# Patient Record
Sex: MALE | Race: WHITE | NOT HISPANIC OR LATINO | URBAN - METROPOLITAN AREA
[De-identification: names, ages, dates, MRNs, and addresses within clinical notes are randomized per-mention and may not be internally consistent; named-entity substitution may affect disease eponyms.]

---

## 2019-02-14 ENCOUNTER — EMERGENCY (EMERGENCY)
Facility: HOSPITAL | Age: 69
LOS: 1 days | End: 2019-02-14

## 2019-02-14 VITALS
OXYGEN SATURATION: 98 % | DIASTOLIC BLOOD PRESSURE: 81 MMHG | HEIGHT: 68 IN | SYSTOLIC BLOOD PRESSURE: 146 MMHG | WEIGHT: 145.06 LBS | RESPIRATION RATE: 17 BRPM | HEART RATE: 52 BPM | TEMPERATURE: 98 F

## 2019-02-14 DIAGNOSIS — G06.2 EXTRADURAL AND SUBDURAL ABSCESS, UNSPECIFIED: Chronic | ICD-10-CM

## 2019-02-14 NOTE — ED ADULT NURSE REASSESSMENT NOTE - NS ED NURSE REASSESS COMMENT FT1
taken for EKG; pt refuses ekg; states pain is gone; opts to go home; advised he may  return if needed

## 2019-02-14 NOTE — ED ADULT NURSE NOTE - EXPLANATION OF PATIENT'S REASON FOR LEAVING
"my chest pain is gone.  I know I'm not having a heart attack.  I'll call tomorrow morning to refill the prescription."

## 2019-03-12 ENCOUNTER — EMERGENCY (EMERGENCY)
Facility: HOSPITAL | Age: 69
LOS: 1 days | Discharge: HOME | End: 2019-03-12
Attending: EMERGENCY MEDICINE | Admitting: EMERGENCY MEDICINE
Payer: MEDICARE

## 2019-03-12 VITALS
OXYGEN SATURATION: 98 % | WEIGHT: 143.08 LBS | DIASTOLIC BLOOD PRESSURE: 80 MMHG | HEIGHT: 68 IN | TEMPERATURE: 98 F | RESPIRATION RATE: 18 BRPM | HEART RATE: 86 BPM | SYSTOLIC BLOOD PRESSURE: 164 MMHG

## 2019-03-12 DIAGNOSIS — G06.2 EXTRADURAL AND SUBDURAL ABSCESS, UNSPECIFIED: Chronic | ICD-10-CM

## 2019-03-12 DIAGNOSIS — Z79.891 LONG TERM (CURRENT) USE OF OPIATE ANALGESIC: ICD-10-CM

## 2019-03-12 DIAGNOSIS — Z79.2 LONG TERM (CURRENT) USE OF ANTIBIOTICS: ICD-10-CM

## 2019-03-12 DIAGNOSIS — R07.9 CHEST PAIN, UNSPECIFIED: ICD-10-CM

## 2019-03-12 PROCEDURE — 93005 ELECTROCARDIOGRAM TRACING: CPT

## 2019-03-12 PROCEDURE — 93010 ELECTROCARDIOGRAM REPORT: CPT

## 2019-03-12 PROCEDURE — 99284 EMERGENCY DEPT VISIT MOD MDM: CPT | Mod: 25

## 2019-03-12 PROCEDURE — 99283 EMERGENCY DEPT VISIT LOW MDM: CPT

## 2019-03-12 NOTE — ED PROVIDER NOTE - ATTENDING CONTRIBUTION TO CARE
I have seen the pt, reviewed all pertinent clinical data, and I agree with the documentation/care/plan executed by MODESTO Cote.

## 2019-03-12 NOTE — ED ADULT NURSE NOTE - CHPI ED NUR SYMPTOMS NEG
no dizziness/no shortness of breath/no chills/no diaphoresis/no vomiting/no fever/no nausea/no syncope/no back pain/no congestion

## 2019-03-12 NOTE — ED ADULT NURSE NOTE - OBJECTIVE STATEMENT
Pt ambulates into ED w/ steady gait, airway patent, A&Ox4 in NAD eliciting CP.  After ECG performed pt states "I don't want any other tests but the ECG."  PA made aware.  Pt agrees to DC AMA.

## 2019-03-12 NOTE — ED PROVIDER NOTE - OBJECTIVE STATEMENT
reports CP today after lifting boxes though fully abated at this time -> states Hx renal disfunction after long term abx for spine infection along with known suprapubic catheter -> states had cardiac w/u ~ 18 months ago  and " well" History limited as patient states no pain at present and seeking AMA d/c if ekg " ok "

## 2019-04-23 ENCOUNTER — EMERGENCY (EMERGENCY)
Facility: HOSPITAL | Age: 69
LOS: 1 days | Discharge: HOME | End: 2019-04-23
Attending: EMERGENCY MEDICINE
Payer: MEDICARE

## 2019-04-23 VITALS
HEART RATE: 76 BPM | OXYGEN SATURATION: 97 % | RESPIRATION RATE: 20 BRPM | DIASTOLIC BLOOD PRESSURE: 78 MMHG | WEIGHT: 145.06 LBS | SYSTOLIC BLOOD PRESSURE: 132 MMHG | HEIGHT: 68 IN | TEMPERATURE: 98 F

## 2019-04-23 VITALS
SYSTOLIC BLOOD PRESSURE: 114 MMHG | HEART RATE: 85 BPM | OXYGEN SATURATION: 98 % | TEMPERATURE: 98 F | DIASTOLIC BLOOD PRESSURE: 70 MMHG | RESPIRATION RATE: 18 BRPM

## 2019-04-23 DIAGNOSIS — G06.2 EXTRADURAL AND SUBDURAL ABSCESS, UNSPECIFIED: Chronic | ICD-10-CM

## 2019-04-23 PROCEDURE — 93010 ELECTROCARDIOGRAM REPORT: CPT

## 2019-04-23 PROCEDURE — 82962 GLUCOSE BLOOD TEST: CPT

## 2019-04-23 PROCEDURE — 99283 EMERGENCY DEPT VISIT LOW MDM: CPT

## 2019-04-23 PROCEDURE — 93005 ELECTROCARDIOGRAM TRACING: CPT

## 2019-04-23 PROCEDURE — 99284 EMERGENCY DEPT VISIT MOD MDM: CPT | Mod: 25

## 2019-04-23 NOTE — ED PROVIDER NOTE - PHYSICAL EXAMINATION
Lamin BRYAN MD PGY1:   PHYSICAL EXAM:    GENERAL: NAD, well-developed  HEENT:  Atraumatic, Normocephalic  CHEST/LUNG: Chest rise equal bilaterally  HEART: Regular rate and rhythm  ABDOMEN: Soft, Nontender, Nondistended  EXTREMITIES:  2+ Peripheral Pulses.  PSYCH: A&Ox3  SKIN: No obvious rashes or lesions

## 2019-04-23 NOTE — ED ADULT NURSE NOTE - OBJECTIVE STATEMENT
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 Patient states the chest pain started 1.5 hours prior to arrival to ED. Pt states that as he was driving he had chest pain again, pulled over, and states he fainted for a few seconds and came back. Pt then drove to ED. Pt states the pain stayed in his chest and then went to his left shoulder. Pt states that this has happened before. Pt did not take aspirin today. Pt states that he is anemic and does not want more blood to be drawn. Pt states that if his EKG is normal he would like to leave. MD Quezada spoke about the risks of leaving the ED with the patient and the patient has been discharged. 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 Patient states the chest pain started 1.5 hours prior to arrival to ED. Pt states that as he was driving he had chest pain again, pulled over, and states he "fainted for a few seconds and came back". Pt then drove to ED. Pt states the pain stayed in his chest and then went to his left shoulder. Pt states that this has happened before. Pt did not take aspirin today. Pt states that he is "anemic and does not want more blood to be drawn". Pt states that if his EKG is normal he would like to leave. MD Quezada spoke about the risks of leaving the ED with the patient and the patient has been discharged.

## 2019-04-23 NOTE — ED ADULT NURSE NOTE - NSIMPLEMENTINTERV_GEN_ALL_ED
Implemented All Fall Risk Interventions:  [place] to call system. Call bell, personal items and telephone within reach. Instruct patient to call for assistance. Room bathroom lighting operational. Non-slip footwear when patient is off stretcher. Physically safe environment: no spills, clutter or unnecessary equipment. Stretcher in lowest position, wheels locked, appropriate side rails in place. Provide visual cue, wrist band, yellow gown, etc. Monitor gait and stability. Monitor for mental status changes and reorient to person, place, and time. Review medications for side effects contributing to fall risk. Reinforce activity limits and safety measures with patient and family.

## 2019-04-23 NOTE — ED ADULT TRIAGE NOTE - CHIEF COMPLAINT QUOTE
pt states while driving had some chest discomfort pulled over got lightheaded states possible syncope at that time then proceded to drive to er

## 2019-04-23 NOTE — ED PROVIDER NOTE - PMH
Anemia  Transfused 3/2015  Back pain    BPH (benign prostatic hyperplasia)    Epidural abscess  3/2015 MRSA  admitted & treated  Patient was managed with Epidural injection for pain Prior to MRSA  Other hydronephrosis  Renal insuffiencey  Spinal stenosis    UTI (urinary tract infection)  With Colonization of Bacteria

## 2019-04-23 NOTE — ED PROVIDER NOTE - OBJECTIVE STATEMENT
Lamin BRYAN MD PGY1: 68 M PMH atonic bladder with suprapubic catheter p/w episode of chest pain and syncope while driving 1.5 hours ago. Assoc with some SOB, was unconcious for a few minutes before regaining conciousness and driving to the ED. Currrently asymptomatic. Last stress 2 months ago wnl. Recently seen in a [facility] for chest pain and had set of normal trops. Patient does not want to stay for laboratory testing.

## 2019-04-23 NOTE — ED PROVIDER NOTE - PROGRESS NOTE DETAILS
ekg performed pt declines any bloodwork explained to pt that with hyperacute t waves there may be electrolyte abnorm and  or early ischemia -- pt verbalizes comprehension of this wants to fu with his cardiologist offered asa pt declines - pt having cp only 1.5 hrs assoc w sob and nausea rad to l shoulder ho htn - concern for acs - pt understands bhagat would include cxr, terle and serial cardiac biomarkers - pt refuses - is ware that he could leave the ed and sysmptoms could worsen including but not limited to STEMi and even dealth Risks, benefits , alternatives discussed with patient.  Patient declines the test is aware of the potential consequences. Patient will follow up with their primary physician

## 2019-04-23 NOTE — ED PROVIDER NOTE - CLINICAL SUMMARY MEDICAL DECISION MAKING FREE TEXT BOX
gaetano pt with [facility] rad to l w assoc s/s including sob no prev cad - stress 5 months ago nml - pt ekg w hyperdynamic t waves - would moniotr delta trop asa and reeval to r/o acs gaetano pt with [facility] rad to l w assoc s/s including sob no prev cad - stress 5 months ago nml - pt ekg w hyperdynamic t waves - would moniotr delta trop asa and reeval to r/o acs    Lamin BRYAN MD PGY1: as above. Patient declining further testing while in the ED beyond and EKG. Patient does not desire testing beyond an EKG. Patient informed that his EKG represented hyperacute T waves that could suggest early MI and we heavily recommend staying for delta trop testing. His episode of syncope is also concerning as it is unclear in etiology and possibly cardiac and we would possibly echo. Patient declines further testing and understand benefits and risks, including death, of not having testing done.

## 2019-04-28 ENCOUNTER — EMERGENCY (EMERGENCY)
Facility: HOSPITAL | Age: 69
LOS: 1 days | Discharge: HOME | End: 2019-04-28
Attending: EMERGENCY MEDICINE
Payer: MEDICARE

## 2019-04-28 VITALS
HEART RATE: 62 BPM | DIASTOLIC BLOOD PRESSURE: 73 MMHG | SYSTOLIC BLOOD PRESSURE: 151 MMHG | OXYGEN SATURATION: 100 % | RESPIRATION RATE: 18 BRPM

## 2019-04-28 VITALS
WEIGHT: 145.06 LBS | RESPIRATION RATE: 20 BRPM | HEART RATE: 53 BPM | OXYGEN SATURATION: 100 % | DIASTOLIC BLOOD PRESSURE: 85 MMHG | TEMPERATURE: 98 F | SYSTOLIC BLOOD PRESSURE: 153 MMHG | HEIGHT: 68 IN

## 2019-04-28 DIAGNOSIS — G06.2 EXTRADURAL AND SUBDURAL ABSCESS, UNSPECIFIED: Chronic | ICD-10-CM

## 2019-04-28 LAB
ALBUMIN SERPL ELPH-MCNC: 3.9 G/DL — SIGNIFICANT CHANGE UP (ref 3.3–5)
ALP SERPL-CCNC: 69 U/L — SIGNIFICANT CHANGE UP (ref 40–120)
ALT FLD-CCNC: 21 U/L — SIGNIFICANT CHANGE UP (ref 10–45)
ANION GAP SERPL CALC-SCNC: 12 MMOL/L — SIGNIFICANT CHANGE UP (ref 5–17)
APPEARANCE UR: ABNORMAL
AST SERPL-CCNC: 26 U/L — SIGNIFICANT CHANGE UP (ref 10–40)
BACTERIA # UR AUTO: ABNORMAL
BILIRUB SERPL-MCNC: 0.6 MG/DL — SIGNIFICANT CHANGE UP (ref 0.2–1.2)
BILIRUB UR-MCNC: NEGATIVE — SIGNIFICANT CHANGE UP
BUN SERPL-MCNC: 32 MG/DL — HIGH (ref 7–23)
CALCIUM SERPL-MCNC: 8.9 MG/DL — SIGNIFICANT CHANGE UP (ref 8.4–10.5)
CHLORIDE SERPL-SCNC: 100 MMOL/L — SIGNIFICANT CHANGE UP (ref 96–108)
CO2 SERPL-SCNC: 19 MMOL/L — LOW (ref 22–31)
COLOR SPEC: YELLOW — SIGNIFICANT CHANGE UP
CREAT SERPL-MCNC: 2.71 MG/DL — HIGH (ref 0.5–1.3)
DIFF PNL FLD: ABNORMAL
EPI CELLS # UR: 0 /HPF — SIGNIFICANT CHANGE UP
GLUCOSE SERPL-MCNC: 88 MG/DL — SIGNIFICANT CHANGE UP (ref 70–99)
GLUCOSE UR QL: NEGATIVE — SIGNIFICANT CHANGE UP
HCT VFR BLD CALC: 33.2 % — LOW (ref 39–50)
HGB BLD-MCNC: 11.6 G/DL — LOW (ref 13–17)
HYALINE CASTS # UR AUTO: 1 /LPF — SIGNIFICANT CHANGE UP (ref 0–2)
KETONES UR-MCNC: NEGATIVE — SIGNIFICANT CHANGE UP
LEUKOCYTE ESTERASE UR-ACNC: ABNORMAL
MAGNESIUM SERPL-MCNC: 1.9 MG/DL — SIGNIFICANT CHANGE UP (ref 1.6–2.6)
MCHC RBC-ENTMCNC: 32.2 PG — SIGNIFICANT CHANGE UP (ref 27–34)
MCHC RBC-ENTMCNC: 35 GM/DL — SIGNIFICANT CHANGE UP (ref 32–36)
MCV RBC AUTO: 91.8 FL — SIGNIFICANT CHANGE UP (ref 80–100)
NITRITE UR-MCNC: POSITIVE
NT-PROBNP SERPL-SCNC: 237 PG/ML — SIGNIFICANT CHANGE UP (ref 0–300)
PH UR: 6 — SIGNIFICANT CHANGE UP (ref 5–8)
PLATELET # BLD AUTO: 237 K/UL — SIGNIFICANT CHANGE UP (ref 150–400)
POTASSIUM SERPL-MCNC: 4.2 MMOL/L — SIGNIFICANT CHANGE UP (ref 3.5–5.3)
POTASSIUM SERPL-SCNC: 4.2 MMOL/L — SIGNIFICANT CHANGE UP (ref 3.5–5.3)
PROT SERPL-MCNC: 6.5 G/DL — SIGNIFICANT CHANGE UP (ref 6–8.3)
PROT UR-MCNC: SIGNIFICANT CHANGE UP
RBC # BLD: 3.61 M/UL — LOW (ref 4.2–5.8)
RBC # FLD: 11.5 % — SIGNIFICANT CHANGE UP (ref 10.3–14.5)
RBC CASTS # UR COMP ASSIST: 6 /HPF — HIGH (ref 0–4)
SODIUM SERPL-SCNC: 131 MMOL/L — LOW (ref 135–145)
SP GR SPEC: 1.01 — LOW (ref 1.01–1.02)
TROPONIN T, HIGH SENSITIVITY RESULT: 21 NG/L — SIGNIFICANT CHANGE UP (ref 0–51)
TROPONIN T, HIGH SENSITIVITY RESULT: 22 NG/L — SIGNIFICANT CHANGE UP (ref 0–51)
UROBILINOGEN FLD QL: NEGATIVE — SIGNIFICANT CHANGE UP
WBC # BLD: 7.1 K/UL — SIGNIFICANT CHANGE UP (ref 3.8–10.5)
WBC # FLD AUTO: 7.1 K/UL — SIGNIFICANT CHANGE UP (ref 3.8–10.5)
WBC UR QL: 252 /HPF — HIGH (ref 0–5)

## 2019-04-28 PROCEDURE — 84484 ASSAY OF TROPONIN QUANT: CPT

## 2019-04-28 PROCEDURE — 99284 EMERGENCY DEPT VISIT MOD MDM: CPT | Mod: 25

## 2019-04-28 PROCEDURE — 93005 ELECTROCARDIOGRAM TRACING: CPT

## 2019-04-28 PROCEDURE — 93010 ELECTROCARDIOGRAM REPORT: CPT

## 2019-04-28 PROCEDURE — 80053 COMPREHEN METABOLIC PANEL: CPT

## 2019-04-28 PROCEDURE — 83880 ASSAY OF NATRIURETIC PEPTIDE: CPT

## 2019-04-28 PROCEDURE — 99284 EMERGENCY DEPT VISIT MOD MDM: CPT | Mod: GC,25

## 2019-04-28 PROCEDURE — 85027 COMPLETE CBC AUTOMATED: CPT

## 2019-04-28 PROCEDURE — 83735 ASSAY OF MAGNESIUM: CPT

## 2019-04-28 PROCEDURE — 81001 URINALYSIS AUTO W/SCOPE: CPT

## 2019-04-28 NOTE — ED ADULT NURSE NOTE - OBJECTIVE STATEMENT
pt 67 yo male seen here 4 days ago for near syncope and chest discomfort returns today for labs pt refused labs the other day because he had tennis games all week pt alert oriented hx kidney disease vanna suprapubic catheter after MRSA infection ost back epidural few years ago pt very active runner  denies any symptoms since the other day pt states perhaps he took 20mg of lisinopril instead of 10mg dose he was supposed to pt ekg completed with labs sent as ordered urine requested from patient placed on continuous cardiac monitering

## 2019-04-28 NOTE — ED PROVIDER NOTE - PHYSICAL EXAMINATION
PHYSICAL EXAM:  GENERAL: NAD,   HEAD:  Atraumatic, Normocephalic  EYES: conjunctiva and sclera clear  ENMT:  Moist mucous membranes  NECK: Supple, No JVD, Normal thyroid  HEART: Regular rate and rhythm; No murmurs, rubs, or gallops  RESPIRATORY: CTA B/L, No W/R/R  ABDOMEN: Soft, Nontender, Nondistended; Bowel sounds present; suprapubic catheter in place  NEUROLOGY: A&Ox3, nonfocal, moving all extremities  EXTREMITIES:  No pedal edema  SKIN: warm, dry, normal color, no rash or abnormal lesions

## 2019-04-28 NOTE — ED PROVIDER NOTE - CLINICAL SUMMARY MEDICAL DECISION MAKING FREE TEXT BOX
68 M PMH atonic bladder with suprapubic catheter p/w episode of chest pain and syncope while driving on Tuesday. Pt with hyperacute T waves on prior EKG continue to be present on current EKG. Will obtain CBC, CMP, BNP, troponin, urinalysis, CXR and cardiac monitoring

## 2019-04-28 NOTE — ED PROVIDER NOTE - ATTENDING CONTRIBUTION TO CARE
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 Pt  believes may have been related to inadvertently taking took much lisinopril. Had normal stress test two years ago. Seen in ed with neg ekg and advise further workup and declined. Pt dc home and since then has been well. Playing tennis 2h/d, with running 5miles x 3, No chest pain, shortness of breath,palps,dizziness, abd pain. diaphoresis, ha, cough, fever chills. PE WDWN male awake alert normocephalic atraumatic neck supple chest clear anterior & posterior. CV no rubs, gallops or murmurs, Neuro gcs 15 speech fluent power 5/5 all extr pain light touch intact, abd Supraopubic cath in place without ttp. LS spine old surg scar no ttp/swelling warmth.  Chad Sanchez MD, Facep

## 2019-04-28 NOTE — ED PROVIDER NOTE - PLAN OF CARE
To follow up with your primary care doctor In this hospital visit your troponin level was found elevated above range. Please follow up your primary care doctor and consider a stress test.

## 2019-04-28 NOTE — ED PROVIDER NOTE - OBJECTIVE STATEMENT
68 M PMH atonic bladder with suprapubic catheter p/w episode of chest pain and syncope while driving on Tuesday. States that CP was in the center of chest and felt like heart burn. States that after CP got out of the car and felt dizzy, but did not loose consciousness. He presented to the ED on 4/23 where he was found to have hyperacute t waves, but patient declined to have any blood work done. Today patient comes in for blood work. Denies any recurrent episodes of chest pain.

## 2019-04-28 NOTE — ED PROVIDER NOTE - NS ED ROS FT
REVIEW OF SYSTEMS:  CONSTITUTIONAL: No weakness, fevers or chills  EYES/ENT: No visual changesn   NECK: No pain or stiffness  RESPIRATORY: No shortness of breath  CARDIOVASCULAR: No chest pain or palpitations  GASTROINTESTINAL: No abdominal or epigastric pain. No nausea or vomiting; No diarrhea or constipation.   GENITOURINARY: No dysuria, frequency or hematuria  SKIN: No itching, burning, rashes, or lesions   All other review of systems is negative unless indicated above.

## 2019-04-28 NOTE — ED PROVIDER NOTE - PROGRESS NOTE DETAILS
Patient refuses cxr. Explained to patient that this is part of the work up. However pt states that he feels fine and that there is nothing wrong with his lungs or heart.

## 2019-04-28 NOTE — ED PROVIDER NOTE - CARE PLAN
Principal Discharge DX:	Troponin level elevated  Goal:	To follow up with your primary care doctor  Assessment and plan of treatment:	In this hospital visit your troponin level was found elevated above range. Please follow up your primary care doctor and consider a stress test.  Secondary Diagnosis:	Chest pain

## 2019-04-28 NOTE — ED ADULT NURSE NOTE - NSIMPLEMENTINTERV_GEN_ALL_ED
Implemented All Universal Safety Interventions:  [place] to call system. Call bell, personal items and telephone within reach. Instruct patient to call for assistance. Room bathroom lighting operational. Non-slip footwear when patient is off stretcher. Physically safe environment: no spills, clutter or unnecessary equipment. Stretcher in lowest position, wheels locked, appropriate side rails in place.

## 2020-06-26 ENCOUNTER — TRANSCRIPTION ENCOUNTER (OUTPATIENT)
Age: 70
End: 2020-06-26

## 2020-08-10 ENCOUNTER — TRANSCRIPTION ENCOUNTER (OUTPATIENT)
Age: 70
End: 2020-08-10

## 2020-08-14 ENCOUNTER — TRANSCRIPTION ENCOUNTER (OUTPATIENT)
Age: 70
End: 2020-08-14

## 2020-10-19 ENCOUNTER — APPOINTMENT (OUTPATIENT)
Dept: ORTHOPEDIC SURGERY | Facility: CLINIC | Age: 70
End: 2020-10-19
Payer: MEDICARE

## 2020-10-19 VITALS
SYSTOLIC BLOOD PRESSURE: 148 MMHG | WEIGHT: 145 LBS | BODY MASS INDEX: 22.76 KG/M2 | HEIGHT: 67 IN | DIASTOLIC BLOOD PRESSURE: 82 MMHG

## 2020-10-19 DIAGNOSIS — Z60.2 PROBLEMS RELATED TO LIVING ALONE: ICD-10-CM

## 2020-10-19 DIAGNOSIS — M47.816 SPONDYLOSIS W/OUT MYELOPATHY OR RADICULOPATHY, LUMBAR REGION: ICD-10-CM

## 2020-10-19 PROCEDURE — 99072 ADDL SUPL MATRL&STAF TM PHE: CPT

## 2020-10-19 PROCEDURE — 99204 OFFICE O/P NEW MOD 45 MIN: CPT

## 2020-10-19 PROCEDURE — 72110 X-RAY EXAM L-2 SPINE 4/>VWS: CPT

## 2020-10-19 RX ORDER — ATORVASTATIN CALCIUM 10 MG/1
10 TABLET, FILM COATED ORAL
Refills: 0 | Status: ACTIVE | COMMUNITY

## 2020-10-19 RX ORDER — LISINOPRIL 10 MG/1
10 TABLET ORAL
Refills: 0 | Status: ACTIVE | COMMUNITY

## 2020-10-19 RX ORDER — FERROUS SULFATE 325(65) MG
TABLET ORAL
Refills: 0 | Status: ACTIVE | COMMUNITY

## 2020-10-19 RX ORDER — PREDNISONE 10 MG/1
10 TABLET ORAL
Qty: 39 | Refills: 0 | Status: ACTIVE | COMMUNITY
Start: 2020-10-19 | End: 1900-01-01

## 2020-10-19 SDOH — SOCIAL STABILITY - SOCIAL INSECURITY: PROBLEMS RELATED TO LIVING ALONE: Z60.2

## 2020-10-23 ENCOUNTER — APPOINTMENT (OUTPATIENT)
Dept: ORTHOPEDIC SURGERY | Facility: CLINIC | Age: 70
End: 2020-10-23

## 2020-11-20 ENCOUNTER — APPOINTMENT (OUTPATIENT)
Dept: ORTHOPEDIC SURGERY | Facility: CLINIC | Age: 70
End: 2020-11-20
Payer: MEDICARE

## 2020-11-20 VITALS — HEIGHT: 68 IN | WEIGHT: 145 LBS | BODY MASS INDEX: 21.98 KG/M2

## 2020-11-20 VITALS — TEMPERATURE: 98.3 F

## 2020-11-20 PROCEDURE — 73562 X-RAY EXAM OF KNEE 3: CPT | Mod: LT

## 2020-11-20 PROCEDURE — 20610 DRAIN/INJ JOINT/BURSA W/O US: CPT | Mod: LT

## 2020-11-20 PROCEDURE — 99214 OFFICE O/P EST MOD 30 MIN: CPT | Mod: 25

## 2020-12-09 RX ORDER — HYALURONATE SODIUM, STABILIZED 88 MG/4 ML
88 SYRINGE (ML) INTRAARTICULAR
Qty: 1 | Refills: 0 | Status: ACTIVE | COMMUNITY
Start: 2020-12-09 | End: 1900-01-01

## 2020-12-18 ENCOUNTER — APPOINTMENT (OUTPATIENT)
Dept: ORTHOPEDIC SURGERY | Facility: CLINIC | Age: 70
End: 2020-12-18
Payer: MEDICARE

## 2020-12-18 VITALS — TEMPERATURE: 97.3 F

## 2020-12-18 PROCEDURE — 20610 DRAIN/INJ JOINT/BURSA W/O US: CPT | Mod: LT

## 2020-12-18 PROCEDURE — 99072 ADDL SUPL MATRL&STAF TM PHE: CPT

## 2021-01-19 ENCOUNTER — APPOINTMENT (OUTPATIENT)
Dept: SPINE | Facility: CLINIC | Age: 71
End: 2021-01-19

## 2021-01-19 VITALS
SYSTOLIC BLOOD PRESSURE: 107 MMHG | HEART RATE: 51 BPM | TEMPERATURE: 97.7 F | OXYGEN SATURATION: 99 % | HEIGHT: 68 IN | WEIGHT: 145 LBS | DIASTOLIC BLOOD PRESSURE: 60 MMHG | BODY MASS INDEX: 21.98 KG/M2

## 2021-03-10 ENCOUNTER — APPOINTMENT (OUTPATIENT)
Dept: ORTHOPEDIC SURGERY | Facility: CLINIC | Age: 71
End: 2021-03-10
Payer: MEDICARE

## 2021-03-10 PROCEDURE — 99072 ADDL SUPL MATRL&STAF TM PHE: CPT

## 2021-03-10 PROCEDURE — 99214 OFFICE O/P EST MOD 30 MIN: CPT | Mod: 25

## 2021-03-10 PROCEDURE — 20610 DRAIN/INJ JOINT/BURSA W/O US: CPT | Mod: LT

## 2021-03-10 PROCEDURE — 73562 X-RAY EXAM OF KNEE 3: CPT | Mod: LT

## 2021-03-16 ENCOUNTER — APPOINTMENT (OUTPATIENT)
Dept: PHYSICAL MEDICINE AND REHAB | Facility: CLINIC | Age: 71
End: 2021-03-16
Payer: MEDICARE

## 2021-03-16 PROCEDURE — 99203 OFFICE O/P NEW LOW 30 MIN: CPT

## 2021-03-16 PROCEDURE — 99072 ADDL SUPL MATRL&STAF TM PHE: CPT

## 2021-03-16 NOTE — REVIEW OF SYSTEMS
[Muscle Weakness] : muscle weakness [Difficulty Walking] : difficulty walking [Fever] : no fever [Chills] : no chills [Chest Pain] : no chest pain [Shortness Of Breath] : no shortness of breath [Wheezing] : no wheezing [Abdominal Pain] : no abdominal pain [FreeTextEntry8] : neurogenic bladder

## 2021-03-16 NOTE — HISTORY OF PRESENT ILLNESS
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 Per patient, he had MRSA infection of his spine in 2015 as a complication of an epidural steroid injection that ultimately required lumbar discectomy, with hospital course complicated by acute renal failure and anemia. He sees Dr. Morgan for medical pain management.\par \par Patient has left foot drop and weakness since his infection, and despite rehab and PT, and exercise regimen, has not improved in strength of his left foot. He ambulates at home and in the community initially with RW, now Quad cane, but had multiple and near falls due to his foot drop. He also has a neurogenic bladder due to injury and has a suprapubic catheter.\par \par He lives in an apartment with 0 MELISSA the building but 1 flight of stairs (10 steps w/ HRx1) to his apartment. He has since retired due to his injury.

## 2021-03-16 NOTE — ASSESSMENT
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 \par Patient will benefit with left AFO for safety in ambulating in the community and prevent falls and complications from such falls. \par Plan:\par - prescription for custom molded AFO provided to orthotist\par - f/u as needed

## 2021-03-16 NOTE — PHYSICAL EXAM
[FreeTextEntry1] : General: AAOx3, appears stated age, NAD\par Cards:+s1/s2\par Pulm: Breathing comfortably in room air\par Abdomen: NTND, soft\par : Suprapubic catheter\par Neurological: Decreased sensation to LT at dorsum of foot and ankle, between 1st and second toes on the left compared to right.\par DTR Right KJ 1+/4, AJ 2+/4, Left KJ 2+/4, AJ 1+/4 \par MSK: 5/5 B/L UE, 5/5 RLE. For LLE: HF 5/5, KE 5/5, KF 5/5, DF 1/5, EHL 1/5, PF 2/5\par Gait: Ambulate with quadcane on right hand. Foot drop on left with left hip hike for clearance. \par Unable to toe walk or heel walk.\par

## 2021-04-02 ENCOUNTER — APPOINTMENT (OUTPATIENT)
Dept: NEUROLOGY | Facility: CLINIC | Age: 71
End: 2021-04-02
Payer: MEDICARE

## 2021-04-02 VITALS — TEMPERATURE: 97.7 F

## 2021-04-02 DIAGNOSIS — M54.17 RADICULOPATHY, LUMBOSACRAL REGION: ICD-10-CM

## 2021-04-02 PROCEDURE — 95885 MUSC TST DONE W/NERV TST LIM: CPT

## 2021-04-02 PROCEDURE — 95908 NRV CNDJ TST 3-4 STUDIES: CPT

## 2021-04-02 PROCEDURE — 99203 OFFICE O/P NEW LOW 30 MIN: CPT | Mod: 25

## 2021-04-02 PROCEDURE — 99072 ADDL SUPL MATRL&STAF TM PHE: CPT

## 2021-04-02 NOTE — ASSESSMENT
[FreeTextEntry1] : EMG/NCV today demonstrates a chronic left L5S1 radiculopathy. \par \par Will refer back to Dr. Cassidy.

## 2021-04-02 NOTE — PHYSICAL EXAM
[1+] : Patella right 1+ [2+] : Ankle jerk left 2+ [FreeTextEntry6] : left foot DF 0/5, ev 4-/5, inv 3/5, PF 3/5, right foot 5/5

## 2021-04-02 NOTE — HISTORY OF PRESENT ILLNESS
[FreeTextEntry1] : Patient referred for EMG to evaluate left foot drop.  He has had this since 2015 when he had a MRSA infection LS spine. \par \par He denies pain but has mild numbness dorsum left foot. \par

## 2021-04-30 ENCOUNTER — APPOINTMENT (OUTPATIENT)
Dept: ORTHOPEDIC SURGERY | Facility: CLINIC | Age: 71
End: 2021-04-30
Payer: MEDICARE

## 2021-04-30 PROCEDURE — 99072 ADDL SUPL MATRL&STAF TM PHE: CPT

## 2021-04-30 PROCEDURE — 99214 OFFICE O/P EST MOD 30 MIN: CPT | Mod: 25

## 2021-04-30 PROCEDURE — 20610 DRAIN/INJ JOINT/BURSA W/O US: CPT | Mod: 50

## 2021-05-26 ENCOUNTER — APPOINTMENT (OUTPATIENT)
Dept: PAIN MANAGEMENT | Facility: CLINIC | Age: 71
End: 2021-05-26
Payer: MEDICARE

## 2021-05-26 VITALS
SYSTOLIC BLOOD PRESSURE: 133 MMHG | HEART RATE: 71 BPM | WEIGHT: 145 LBS | BODY MASS INDEX: 21.98 KG/M2 | DIASTOLIC BLOOD PRESSURE: 74 MMHG | HEIGHT: 68 IN

## 2021-05-26 PROCEDURE — 99204 OFFICE O/P NEW MOD 45 MIN: CPT

## 2021-05-26 NOTE — HISTORY OF PRESENT ILLNESS
[FreeTextEntry1] : Patient was seen by me in 2016 s/p Lumbar abscess requiring surgical intervention - did well on fentanyl and dilaudid. Weaned self off the meds in 2016 and pain remained controlled with ES Tylenol until November 2020. He recalls falling one month earlier but did not initially co pain until one month later. Went to see Dr. Mon in November - whi felt pain was secondary to arthritis and was prescribed prednisone with 90 percent relief. However, pain has gradually returned to 9/10. Notes ho kidney disease since ALESSANDRA.

## 2021-05-26 NOTE — ASSESSMENT
[FreeTextEntry1] : Chronic LS radiculopathy\par Chronic back pain\par \par Will initiate endocet 75 mgs tid as directed 2 week supply \par Patient can not take NSAIDS DUE TO KIDNEY DISEASE\par istop reviewed\par UDT\par No signs of toxicity observed and will continue to monitor.\par \par \par \par \par  [Opioids] : Patient was explained in detail about pain control by using opioids. Patient has signed and fully understands our guidelines for medication and drug screening.  Patient understands the side effects of opioids, including, but not limited to, drug tolerance, dependence, potential for addiction. This class of drugs is habit-forming and [place] regulated. The sedative effects of opioids can be potentiated by taking alcohol or any sleeping pills, along with opioids. The decision to drive is patient’s responsibility, as opioids can affect his/her driving ability and ability to concentrate. The long-term place is not clear, however, patient understands that once the pain control optimizes, the goal will be to wean off the opioids. All the issues regarding opioid treatment have been addressed satisfactorily.

## 2021-05-26 NOTE — PHYSICAL EXAM
[FreeTextEntry1] : Constitutional: No signs of distress. No signs of toxicity. \par MS: Alert and well oriented. Speech fluent. No aphasia. Fund of knowledge intact. \par Psychiatric: Mood stable.\par symmetrically, tongue midline\par Motor: Adequate bulk, tone, strength. 5/5 strength\par DTR: present and symmetrical; no clonus\par Sensory: intact to primary and secondary modalities\par Cerebellar intact Gait: antalgic with cane\par Eyes: no redness or swelling\par HEENT: intact\par Neck: No masses noted\par Pulmonary: no respiratory distress\par Vascular: no temperature,color changes; no edema\par Musculoskeletal: examination of the cervical spine reveals no midline tenderness, range of motion full upon flexion, extension and lateral rotation. Negative facet tenderness, Examination of the lumbar spine reveals no bilateral right > left paraspinal tenderness; Range of motion limited flexion and extension;No tenderness of sciatic notch, No tenderness of bilateral greater trochanters, Negative MADONNA, negative SLRT bilaterally\par Skin: No rash.\par

## 2021-06-29 ENCOUNTER — APPOINTMENT (OUTPATIENT)
Dept: PAIN MANAGEMENT | Facility: CLINIC | Age: 71
End: 2021-06-29
Payer: MEDICARE

## 2021-06-29 VITALS
BODY MASS INDEX: 21.22 KG/M2 | HEIGHT: 68 IN | HEART RATE: 70 BPM | SYSTOLIC BLOOD PRESSURE: 136 MMHG | DIASTOLIC BLOOD PRESSURE: 80 MMHG | WEIGHT: 140 LBS

## 2021-06-29 PROCEDURE — 99214 OFFICE O/P EST MOD 30 MIN: CPT

## 2021-07-05 NOTE — HISTORY OF PRESENT ILLNESS
[FreeTextEntry1] : Patient reports the oxycodone promoted significant pain relief. Off it for 2 weeks due to insurance issues.\par \par The pain meds took the pain away able to be more functional No new medical problems.\par \par

## 2021-07-05 NOTE — ASSESSMENT
[Opioids] : Patient was explained in detail about pain control by using opioids. Patient has signed and fully understands our guidelines for medication and drug screening.  Patient understands the side effects of opioids, including, but not limited to, drug tolerance, dependence, potential for addiction. This class of drugs is habit-forming and [place] regulated. The sedative effects of opioids can be potentiated by taking alcohol or any sleeping pills, along with opioids. The decision to drive is patient’s responsibility, as opioids can affect his/her driving ability and ability to concentrate. The long-term place is not clear, however, patient understands that once the pain control optimizes, the goal will be to wean off the opioids. All the issues regarding opioid treatment have been addressed satisfactorily.  [FreeTextEntry1] : Chronic LS radiculopathy\par Chronic back pain\par \par Will initiate endocet 75 mgs tid as directed 2 week supply \par Patient can not take NSAIDS DUE TO KIDNEY DISEASE\par istop reviewed\par UDT\par No signs of toxicity observed and will continue to monitor.\par \par \par \par \par

## 2021-07-07 DIAGNOSIS — Z12.11 ENCOUNTER FOR SCREENING FOR MALIGNANT NEOPLASM OF COLON: ICD-10-CM

## 2021-07-07 RX ORDER — POLYETHYLENE GLYCOL 3350, SODIUM SULFATE, SODIUM CHLORIDE, POTASSIUM CHLORIDE, SODIUM ASCORBATE, AND ASCORBIC ACID 7.5-2.691G
100 KIT ORAL
Qty: 1 | Refills: 0 | Status: DISCONTINUED | COMMUNITY
Start: 2021-06-12 | End: 2021-07-07

## 2021-07-07 RX ORDER — POLYETHYLENE GLYCOL-3350 AND ELECTROLYTES 236; 6.74; 5.86; 2.97; 22.74 G/274.31G; G/274.31G; G/274.31G; G/274.31G; G/274.31G
236 POWDER, FOR SOLUTION ORAL
Qty: 1 | Refills: 0 | Status: ACTIVE | COMMUNITY
Start: 2021-07-07 | End: 1900-01-01

## 2021-07-13 ENCOUNTER — OUTPATIENT (OUTPATIENT)
Dept: OUTPATIENT SERVICES | Facility: HOSPITAL | Age: 71
LOS: 1 days | Discharge: HOME | End: 2021-07-13
Payer: MEDICARE

## 2021-07-13 ENCOUNTER — RESULT REVIEW (OUTPATIENT)
Age: 71
End: 2021-07-13

## 2021-07-13 ENCOUNTER — APPOINTMENT (OUTPATIENT)
Dept: GASTROENTEROLOGY | Facility: HOSPITAL | Age: 71
End: 2021-07-13

## 2021-07-13 VITALS
WEIGHT: 141.1 LBS | SYSTOLIC BLOOD PRESSURE: 129 MMHG | HEIGHT: 68 IN | RESPIRATION RATE: 19 BRPM | HEART RATE: 66 BPM | DIASTOLIC BLOOD PRESSURE: 71 MMHG | TEMPERATURE: 98 F | OXYGEN SATURATION: 99 %

## 2021-07-13 VITALS
SYSTOLIC BLOOD PRESSURE: 132 MMHG | DIASTOLIC BLOOD PRESSURE: 58 MMHG | HEART RATE: 67 BPM | OXYGEN SATURATION: 100 % | RESPIRATION RATE: 14 BRPM

## 2021-07-13 DIAGNOSIS — Z12.11 ENCOUNTER FOR SCREENING FOR MALIGNANT NEOPLASM OF COLON: ICD-10-CM

## 2021-07-13 DIAGNOSIS — G06.2 EXTRADURAL AND SUBDURAL ABSCESS, UNSPECIFIED: Chronic | ICD-10-CM

## 2021-07-13 PROCEDURE — 45380 COLONOSCOPY AND BIOPSY: CPT

## 2021-07-13 PROCEDURE — 88305 TISSUE EXAM BY PATHOLOGIST: CPT | Mod: 26

## 2021-07-15 LAB — SURGICAL PATHOLOGY STUDY: SIGNIFICANT CHANGE UP

## 2021-07-18 ENCOUNTER — NON-APPOINTMENT (OUTPATIENT)
Age: 71
End: 2021-07-18

## 2021-07-26 ENCOUNTER — APPOINTMENT (OUTPATIENT)
Dept: PAIN MANAGEMENT | Facility: CLINIC | Age: 71
End: 2021-07-26
Payer: MEDICARE

## 2021-07-26 PROCEDURE — 99212 OFFICE O/P EST SF 10 MIN: CPT | Mod: 95

## 2021-07-26 NOTE — ASSESSMENT
[FreeTextEntry1] : NO changes today . \par RTO 1 m onth \par TEB is ok. \par I will continue to monitor ISTOP . \par

## 2021-07-26 NOTE — HISTORY OF PRESENT ILLNESS
[FreeTextEntry1] : istop#[id number]\par Dx : Lower back pain. \par Pt has a lumbar discectomy in 2015 , then had MRSA infection. \par \par Pain is in lower back and can sometimes radiate to legs.\par No signs of toxicity noted today. \par Pt reminded to avoid alcohol and keep meds secure .\par Pt denies constipation. \par

## 2021-07-26 NOTE — REASON FOR VISIT
[Home] : at home, [unfilled] , at the time of the visit. [Medical Office: ([facility])___] : at the medical office located in  [Verbal consent obtained from patient] : the patient, [unfilled] [Follow-Up: _____] : a [unfilled] follow-up visit

## 2021-08-16 ENCOUNTER — APPOINTMENT (OUTPATIENT)
Dept: PAIN MANAGEMENT | Facility: CLINIC | Age: 71
End: 2021-08-16
Payer: MEDICARE

## 2021-08-16 PROCEDURE — 99212 OFFICE O/P EST SF 10 MIN: CPT | Mod: 95

## 2021-08-16 NOTE — PHYSICAL EXAM
[General Appearance - Alert] : alert [General Appearance - Well-Appearing] : healthy appearing [] : normal voice and communication [Oriented To Time, Place, And Person] : oriented to person, place, and time [Mood] : the mood was normal [Affect] : the affect was normal [Sclera] : the sclera and conjunctiva were normal

## 2021-08-16 NOTE — REASON FOR VISIT
[Home] : at home, [unfilled] , at the time of the visit. [Medical Office: ([facility])___] : at the medical office located in  [Verbal consent obtained from patient] : the patient, [unfilled]

## 2021-08-16 NOTE — REVIEW OF SYSTEMS
[Fever] : no fever [Chills] : no chills [Chest Pain] : no chest pain [Palpitations] : no palpitations [Constipation] : no constipation

## 2021-08-16 NOTE — HISTORY OF PRESENT ILLNESS
[FreeTextEntry1] : ISTOP # [id number]\par Pt continues to have lower back pain. \par Had a hx of Left drop foot. \par Denies dysfunction to bladder or bowel. \par \par Mood has been stable . \par Denies alcohol use. \par \par Is under care of Nephrology for chronic kidney disease.

## 2021-08-16 NOTE — ASSESSMENT
[FreeTextEntry1] : Discussed with pt trying to decrease to BID dosing or decreasing to 5mg . \par Pt will try to decrease to BID dosing . \par RTO 1 month \par TEB is ok . [Opioids] : Patient was explained in detail about pain control by using opioids. Patient has signed and fully understands our guidelines for medication and drug screening.  Patient understands the side effects of opioids, including, but not limited to, drug tolerance, dependence, potential for addiction. This class of drugs is habit-forming and [place] regulated. The sedative effects of opioids can be potentiated by taking alcohol or any sleeping pills, along with opioids. The decision to drive is patient’s responsibility, as opioids can affect his/her driving ability and ability to concentrate. The long-term place is not clear, however, patient understands that once the pain control optimizes, the goal will be to wean off the opioids. All the issues regarding opioid treatment have been addressed satisfactorily.

## 2021-08-31 ENCOUNTER — EMERGENCY (EMERGENCY)
Facility: HOSPITAL | Age: 71
LOS: 1 days | End: 2021-08-31
Payer: MEDICARE

## 2021-08-31 VITALS
DIASTOLIC BLOOD PRESSURE: 82 MMHG | HEART RATE: 82 BPM | TEMPERATURE: 99 F | OXYGEN SATURATION: 98 % | SYSTOLIC BLOOD PRESSURE: 138 MMHG | HEIGHT: 68 IN | RESPIRATION RATE: 18 BRPM

## 2021-08-31 DIAGNOSIS — G06.2 EXTRADURAL AND SUBDURAL ABSCESS, UNSPECIFIED: Chronic | ICD-10-CM

## 2021-08-31 PROCEDURE — L9991: CPT

## 2021-08-31 NOTE — ED ADULT TRIAGE NOTE - AS HEIGHT TYPE
[de-identified] : 75-year-old male right knee arthritis exacerbation\par \par Injection: Right knee joint.\par Indication: [Arthritis\par A discussion was had with the patient regarding this procedure and all questions were answered. All risks, benefits and alternatives were discussed. These included but were not limited to bleeding, infection, and allergic reaction. Alcohol was used to clean the skin, and betadine was used to sterilize and prep the area in the supero-lateral aspect of the right knee. Ethyl chloride spray was then used as a topical anesthetic. A 21-gauge needle was used to inject 4cc of 1% lidocaine and 1cc of 40mg/ml methylprednisolone into the knee. A sterile bandage was then applied. The patient tolerated the procedure well and there were no complications. \par \par All questions answered followup as needed. 
stated

## 2021-09-01 ENCOUNTER — TRANSCRIPTION ENCOUNTER (OUTPATIENT)
Age: 71
End: 2021-09-01

## 2021-09-28 ENCOUNTER — APPOINTMENT (OUTPATIENT)
Dept: PAIN MANAGEMENT | Facility: CLINIC | Age: 71
End: 2021-09-28
Payer: MEDICARE

## 2021-09-28 DIAGNOSIS — M21.372 FOOT DROP, LEFT FOOT: ICD-10-CM

## 2021-09-28 PROCEDURE — 99213 OFFICE O/P EST LOW 20 MIN: CPT | Mod: 95

## 2021-09-28 NOTE — PHYSICAL EXAM
[General Appearance - Alert] : alert [FreeTextEntry1] : no signs of toxicity  [Oriented To Time, Place, And Person] : oriented to person, place, and time [Affect] : the affect was normal

## 2021-09-28 NOTE — HISTORY OF PRESENT ILLNESS
[Home] : at home, [unfilled] , at the time of the visit. [Medical Office: ([facility])___] : at the medical office located in  [Verbal consent obtained from patient] : the patient, [unfilled] [FreeTextEntry1] : 69 y/o M with Pmhx left foot drop and chronic back pain who is following up today via telemedicine. He tried to decreased his Oxycodone but his pain worsened and he increased this back. Denies new medical issues.  No constipation and having BM daily.  \par \par \par

## 2021-09-28 NOTE — ASSESSMENT
[FreeTextEntry1] : 71 y/o M with Pmhx left foot drop and chronic back pain. \par \par -he is on Oxycodone 7.5/325 TID qty 90 and is having difficulty tapering down. \par -will Change to Oxycodone 7.5/325 3-4x/day qty 120 to provide pain relief throughout the day and continue to taper further\par I-Stop reviewed,  reference #: [id number]\par No signs of aberrant behavior and will continue to monitor for signs of toxicity.\par Reminded to continue to avoid alcohol.\par \par \par I am seeing CHARITY CASTILLO as incident to service. Dr. Morgan is present in the office suite immediately available and able to provide assistance and direction throughout the time the service was performed.\par

## 2021-10-12 ENCOUNTER — APPOINTMENT (OUTPATIENT)
Dept: PAIN MANAGEMENT | Facility: CLINIC | Age: 71
End: 2021-10-12
Payer: MEDICARE

## 2021-10-12 PROCEDURE — 99212 OFFICE O/P EST SF 10 MIN: CPT | Mod: 95

## 2021-10-12 NOTE — HISTORY OF PRESENT ILLNESS
[FreeTextEntry1] : Pt returns today via TEB . \par He continues to have lower back pain ,report his pain is worse after Percocet was changed from 7.5/ 325mg to 5/325mg QID . Pt has had to take more tab - 4 1/2 per day . \par He denies any new symptoms. Patient wishes to go back to 7.5 mg TID . \par No signs of toxicity noted \par  ISTOP #[id number]

## 2021-10-12 NOTE — ASSESSMENT
[FreeTextEntry1] : I agree to return to Percocet 7.5mg / 325mg TID , should not be filled until closer to refill date ~ 10/25.\par  [Opioids] : Patient was explained in detail about pain control by using opioids. Patient has signed and fully understands our guidelines for medication and drug screening.  Patient understands the side effects of opioids, including, but not limited to, drug tolerance, dependence, potential for addiction. This class of drugs is habit-forming and [place] regulated. The sedative effects of opioids can be potentiated by taking alcohol or any sleeping pills, along with opioids. The decision to drive is patient’s responsibility, as opioids can affect his/her driving ability and ability to concentrate. The long-term place is not clear, however, patient understands that once the pain control optimizes, the goal will be to wean off the opioids. All the issues regarding opioid treatment have been addressed satisfactorily.

## 2021-11-11 ENCOUNTER — APPOINTMENT (OUTPATIENT)
Dept: ORTHOPEDIC SURGERY | Facility: CLINIC | Age: 71
End: 2021-11-11
Payer: MEDICARE

## 2021-11-11 VITALS — WEIGHT: 140 LBS | BODY MASS INDEX: 21.22 KG/M2 | HEIGHT: 68 IN

## 2021-11-11 DIAGNOSIS — M25.562 PAIN IN LEFT KNEE: ICD-10-CM

## 2021-11-11 PROCEDURE — 20610 DRAIN/INJ JOINT/BURSA W/O US: CPT | Mod: LT

## 2021-11-11 RX ORDER — HYALURONATE SODIUM, STABILIZED 88 MG/4 ML
88 SYRINGE (ML) INTRAARTICULAR
Qty: 1 | Refills: 0 | Status: ACTIVE | COMMUNITY
Start: 2021-11-11

## 2021-11-12 PROBLEM — M25.562 ACUTE PAIN OF LEFT KNEE: Status: ACTIVE | Noted: 2020-11-20

## 2021-11-17 NOTE — ED PROVIDER NOTE - SEVERITY
1)  Call Physician for any signs of wound infection: Fever greater than 101 degrees F, bleeding, separation of incision, pus like drainage, or excessive swelling      2)  Call Physician for difficulty breathing, vomiting, inability to tolerate oral intake      3)  Call Physician for any pain that is not controlled by pain medication or anything worrisome      4)  Avoid driving while taking pain medications or experiencing pain      5)  Contact physician's office for post-operative appointment  6) Call Physician if soaking a pad in less than an hour for two consecutive pads   7) Pelvic rest until cleared by your physician  8) If taking an oral birth control, may need a back-up method for next 7-10 days.     Additional Paperwork:  When To Call Your Doctor  Tips for Dealing with Nausea and Vomiting  Constipation  Care After Your Gynecological Procedures    1) Llame al médico por cualquier signo de infección de la herida: fiebre superior a 101 grados F, sangrado, separación de la incisión, supuración similar a pus o hinchazón excesiva  2) Llame al médico si tiene dificultad para respirar, vómitos o incapacidad para tolerar la ingesta oral.  3) Llame al médico por cualquier dolor que no esté controlado con analgésicos o cualquier cosa preocupante.  4) Evite conducir mientras kapil analgésicos o experimenta dolor.  5) Comuníquese con el consultorio del médico para dylan raoul postoperatoria  6) Llame al médico si empapa dylan toalla sanitaria en menos de dylan hora rob dos toallas higiénicas consecutivas  7) Confluence pélvico hasta que al médico lo autorice  8) Si está tomando un método anticonceptivo oral, es posible que necesite un método de respaldo rob los próximos 7 a 10 días.    Papeleo adicional:  Cuándo llamar a al médico  Consejos para lidiar con las náuseas y los vómitos  Estreñimiento  Cuidados después de celi procedimientos ginecológicos  
PAIN SCALE 5 OF 10.

## 2021-11-18 ENCOUNTER — APPOINTMENT (OUTPATIENT)
Dept: ORTHOPEDIC SURGERY | Facility: CLINIC | Age: 71
End: 2021-11-18
Payer: MEDICARE

## 2021-11-18 VITALS
BODY MASS INDEX: 21.22 KG/M2 | SYSTOLIC BLOOD PRESSURE: 163 MMHG | HEART RATE: 75 BPM | HEIGHT: 68 IN | WEIGHT: 140 LBS | DIASTOLIC BLOOD PRESSURE: 55 MMHG

## 2021-11-18 DIAGNOSIS — M17.12 UNILATERAL PRIMARY OSTEOARTHRITIS, LEFT KNEE: ICD-10-CM

## 2021-11-18 PROCEDURE — 20610 DRAIN/INJ JOINT/BURSA W/O US: CPT | Mod: 50

## 2021-11-18 PROCEDURE — 73562 X-RAY EXAM OF KNEE 3: CPT | Mod: LT

## 2021-11-18 PROCEDURE — 99214 OFFICE O/P EST MOD 30 MIN: CPT | Mod: 25

## 2021-11-30 ENCOUNTER — APPOINTMENT (OUTPATIENT)
Dept: PAIN MANAGEMENT | Facility: CLINIC | Age: 71
End: 2021-11-30

## 2021-12-01 ENCOUNTER — APPOINTMENT (OUTPATIENT)
Dept: PAIN MANAGEMENT | Facility: CLINIC | Age: 71
End: 2021-12-01
Payer: MEDICARE

## 2021-12-01 ENCOUNTER — TRANSCRIPTION ENCOUNTER (OUTPATIENT)
Age: 71
End: 2021-12-01

## 2021-12-01 VITALS
HEART RATE: 46 BPM | SYSTOLIC BLOOD PRESSURE: 156 MMHG | DIASTOLIC BLOOD PRESSURE: 94 MMHG | WEIGHT: 139 LBS | HEIGHT: 68 IN | BODY MASS INDEX: 21.07 KG/M2

## 2021-12-01 DIAGNOSIS — M54.17 RADICULOPATHY, LUMBOSACRAL REGION: ICD-10-CM

## 2021-12-01 DIAGNOSIS — M46.20 OSTEOMYELITIS OF VERTEBRA, SITE UNSPECIFIED: ICD-10-CM

## 2021-12-01 PROCEDURE — 99214 OFFICE O/P EST MOD 30 MIN: CPT

## 2021-12-01 NOTE — REVIEW OF SYSTEMS
[Back Pain] : ~T back pain [As Noted in HPI] : as noted in HPI [Limb Weakness] : limb weakness [Negative] : Heme/Lymph [de-identified] : left foot drop

## 2021-12-01 NOTE — ASSESSMENT
[FreeTextEntry1] : 70 y/o M with Lumbar epidural abscess with hypotonic bladder and left foot drop with chronic lower back pain responding well to Oxycodone 7.5/325 mg TID. \par \par No refill needed today, can increase Oxycodone 7.5/325 mg TID qty  tabs for a few months.  Plan to re-evaluate and gradually taper off oxycodone in 3-4 months. \par I-Stop reviewed,  reference #: [id number]\par No signs of aberrant behavior and will continue to monitor for signs of toxicity.\par Reminded to continue to avoid alcohol.\par UDS repeated today \par \par CHARITY CASTILLO  was seen and examined with Dr. Morgan who agrees with the assessment and plan.\par \par \par \par \par

## 2021-12-01 NOTE — HISTORY OF PRESENT ILLNESS
[FreeTextEntry1] : 70 y/o M with Lumbar epidural abscess with hypotonic bladder and left foot drop with chronic lower back pain who presents today for follow up.  Today he reports that his pain has significantly improved . Without pain meds pain localized in lower back 9-10/10 down to 2/10 with Percocet 7.5/325 mg. Pain gradually increases after about 5 hours . Approximately 5x/month pain is waking him up from sleep . \par \par Denies AE but normal BM \par \par Current meds: Oxycodone 7.5/325 mg TID .

## 2021-12-21 ENCOUNTER — APPOINTMENT (OUTPATIENT)
Dept: PAIN MANAGEMENT | Facility: CLINIC | Age: 71
End: 2021-12-21
Payer: MEDICARE

## 2021-12-21 PROCEDURE — 99212 OFFICE O/P EST SF 10 MIN: CPT | Mod: 95

## 2021-12-21 NOTE — REVIEW OF SYSTEMS
[Back Pain] : ~T back pain [As Noted in HPI] : as noted in HPI [Limb Weakness] : limb weakness [Negative] : Heme/Lymph [de-identified] : left foot drop

## 2021-12-21 NOTE — ASSESSMENT
[FreeTextEntry1] : 72 y/o M with Lumbar epidural abscess with hypotonic bladder and left foot drop with chronic lower back pain. \par \par Increased Oxycodone 7.5/325 mg TID qty  tabs for a few months.  Plan to re-evaluate and gradually taper off oxycodone in 3-4 months. \par I-Stop reviewed,  reference #: [id number]\par No signs of aberrant behavior and will continue to monitor for signs of toxicity.\par Reminded to continue to avoid alcohol.\par \par CHARITY CASTILLO  was seen and examined with Dr. Morgan who agrees with the assessment and plan.\par \par \par \par \par  [Opioids] : Patient was explained in detail about pain control by using opioids. Patient has signed and fully understands our guidelines for medication and drug screening.  Patient understands the side effects of opioids, including, but not limited to, drug tolerance, dependence, potential for addiction. This class of drugs is habit-forming and [place] regulated. The sedative effects of opioids can be potentiated by taking alcohol or any sleeping pills, along with opioids. The decision to drive is patient’s responsibility, as opioids can affect his/her driving ability and ability to concentrate. The long-term place is not clear, however, patient understands that once the pain control optimizes, the goal will be to wean off the opioids. All the issues regarding opioid treatment have been addressed satisfactorily.

## 2021-12-21 NOTE — HISTORY OF PRESENT ILLNESS
[FreeTextEntry1] : 72 y/o M with Lumbar epidural abscess with hypotonic bladder and left foot drop with chronic lower back pain who presents today for follow up. He is having constant pain in his lower back lower back 9-10/10 down to 2/10 with Percocet 7.5/325 mg and gradually increases as the night goes on.  Approximately 5x/month pain is waking him up from sleep . \par \par Denies AE but normal BM \par \par Current meds: Oxycodone 7.5/325 mg TID .

## 2022-01-31 ENCOUNTER — APPOINTMENT (OUTPATIENT)
Dept: PAIN MANAGEMENT | Facility: CLINIC | Age: 72
End: 2022-01-31
Payer: MEDICARE

## 2022-01-31 PROCEDURE — 99212 OFFICE O/P EST SF 10 MIN: CPT | Mod: 95

## 2022-01-31 NOTE — HISTORY OF PRESENT ILLNESS
[FreeTextEntry1] : 70 y/o M with Lumbar epidural abscess with hypotonic bladder and left foot drop with chronic lower back pain who presents today for follow up. He is having constant pain in his lower back lower back 9-10/10 down to 2/10 with Percocet 7.5/325 mg and gradually increases as the night goes on. \par Denies AE normal BM \par \par Current meds: Oxycodone 7.5/325 mg 3-4x/ day .

## 2022-01-31 NOTE — REVIEW OF SYSTEMS
[Back Pain] : ~T back pain [As Noted in HPI] : as noted in HPI [Limb Weakness] : limb weakness [Negative] : Heme/Lymph [de-identified] : left foot drop

## 2022-01-31 NOTE — REASON FOR VISIT
[Home] : at home, [unfilled] , at the time of the visit. [Medical Office: ([facility])___] : at the medical office located in  [Verbal consent obtained from patient] : the patient, [unfilled] [Follow-Up: _____] : a [unfilled] follow-up visit

## 2022-01-31 NOTE — ASSESSMENT
[FreeTextEntry1] : istop#[id number].\par Pt reminded the increase to 100 tablets was temporary and will be re - evaluated in the future to decrease again. \par RTO 1 month for an in person appt. \par We will continue to monitor ISTOP and for signs of toxicity. \par \par  [Opioids] : Patient was explained in detail about pain control by using opioids. Patient has signed and fully understands our guidelines for medication and drug screening.  Patient understands the side effects of opioids, including, but not limited to, drug tolerance, dependence, potential for addiction. This class of drugs is habit-forming and [place] regulated. The sedative effects of opioids can be potentiated by taking alcohol or any sleeping pills, along with opioids. The decision to drive is patient’s responsibility, as opioids can affect his/her driving ability and ability to concentrate. The long-term place is not clear, however, patient understands that once the pain control optimizes, the goal will be to wean off the opioids. All the issues regarding opioid treatment have been addressed satisfactorily.

## 2022-03-03 ENCOUNTER — APPOINTMENT (OUTPATIENT)
Dept: PAIN MANAGEMENT | Facility: CLINIC | Age: 72
End: 2022-03-03
Payer: MEDICARE

## 2022-03-03 PROCEDURE — 99212 OFFICE O/P EST SF 10 MIN: CPT | Mod: 95

## 2022-03-03 NOTE — ASSESSMENT
[Opioids] : Patient was explained in detail about pain control by using opioids. Patient has signed and fully understands our guidelines for medication and drug screening.  Patient understands the side effects of opioids, including, but not limited to, drug tolerance, dependence, potential for addiction. This class of drugs is habit-forming and [place] regulated. The sedative effects of opioids can be potentiated by taking alcohol or any sleeping pills, along with opioids. The decision to drive is patient’s responsibility, as opioids can affect his/her driving ability and ability to concentrate. The long-term place is not clear, however, patient understands that once the pain control optimizes, the goal will be to wean off the opioids. All the issues regarding opioid treatment have been addressed satisfactorily.  [FreeTextEntry1] : 72 y/o M CKD stage 5 with suprapubic cath with Lumbar epidural abscess with hypotonic bladder and left foot drop with chronic lower back pain. ? candidate for renal transplant eval pending at [facility]. \par \par Oxycodone 7.5/325 mg 3-4 qty, qty 110 prescribed to ralign refills with follow up visits but will attempt to taper to  next month.  \par I-Stop reviewed,  reference #: [id number]\par No signs of aberrant behavior and will continue to monitor for signs of toxicity.\par Reminded to continue to avoid alcohol.\par \par CHARITY ANNA  was seen and examined with Dr. Morgan who agrees with the assessment and plan.\par \par \par \par \par

## 2022-03-03 NOTE — REVIEW OF SYSTEMS
[Back Pain] : ~T back pain [As Noted in HPI] : as noted in HPI [Limb Weakness] : limb weakness [Negative] : Heme/Lymph [de-identified] : left foot drop

## 2022-03-03 NOTE — HISTORY OF PRESENT ILLNESS
[FreeTextEntry1] : 72 y/o M CKD stage 5 with Lumbar epidural abscess with hypotonic bladder and left foot drop with chronic lower back pain who is being seen via TEB for follow up.  He is following up regularly with nephro and seeing specialist at [facility] and was be placed on transplant list.   He is having constant pain in his lower back lower back 9-10/10 down to 2-3/10 with Percocet 7.5/325 mg and gradually increases as the night goes on. He has been off medications since yesterday as he ran out since his appointment was made for more than 30 days. \par Denies AE, + constipation on occasion but normal BM \par \par Current meds: Oxycodone 7.5/325 mg 3x/day.

## 2022-03-03 NOTE — REASON FOR VISIT
[Follow-Up: _____] : a [unfilled] follow-up visit [Home] : at home, [unfilled] , at the time of the visit. [Medical Office: ([facility])___] : at the medical office located in  [Verbal consent obtained from patient] : the patient, [unfilled]

## 2022-03-06 ENCOUNTER — EMERGENCY (EMERGENCY)
Facility: HOSPITAL | Age: 72
LOS: 1 days | Discharge: HOME | End: 2022-03-06
Attending: EMERGENCY MEDICINE
Payer: MEDICARE

## 2022-03-06 VITALS
HEART RATE: 70 BPM | RESPIRATION RATE: 18 BRPM | WEIGHT: 139.99 LBS | TEMPERATURE: 98 F | SYSTOLIC BLOOD PRESSURE: 159 MMHG | HEIGHT: 68 IN | DIASTOLIC BLOOD PRESSURE: 88 MMHG | OXYGEN SATURATION: 97 %

## 2022-03-06 DIAGNOSIS — G06.2 EXTRADURAL AND SUBDURAL ABSCESS, UNSPECIFIED: Chronic | ICD-10-CM

## 2022-03-06 PROCEDURE — 99283 EMERGENCY DEPT VISIT LOW MDM: CPT

## 2022-03-07 LAB
APPEARANCE UR: ABNORMAL
BACTERIA # UR AUTO: ABNORMAL
BILIRUB UR-MCNC: NEGATIVE — SIGNIFICANT CHANGE UP
COLOR SPEC: SIGNIFICANT CHANGE UP
DIFF PNL FLD: ABNORMAL
EPI CELLS # UR: 0 /HPF — SIGNIFICANT CHANGE UP
GLUCOSE UR QL: ABNORMAL
HYALINE CASTS # UR AUTO: 3 /LPF — HIGH (ref 0–2)
KETONES UR-MCNC: NEGATIVE — SIGNIFICANT CHANGE UP
LEUKOCYTE ESTERASE UR-ACNC: ABNORMAL
NITRITE UR-MCNC: NEGATIVE — SIGNIFICANT CHANGE UP
PH UR: 6.5 — SIGNIFICANT CHANGE UP (ref 5–8)
PROT UR-MCNC: ABNORMAL
RBC CASTS # UR COMP ASSIST: 5 /HPF — HIGH (ref 0–4)
SP GR SPEC: 1.01 — SIGNIFICANT CHANGE UP (ref 1.01–1.02)
UROBILINOGEN FLD QL: NEGATIVE — SIGNIFICANT CHANGE UP
WBC UR QL: 141 /HPF — HIGH (ref 0–5)

## 2022-03-07 PROCEDURE — 87077 CULTURE AEROBIC IDENTIFY: CPT

## 2022-03-07 PROCEDURE — 99283 EMERGENCY DEPT VISIT LOW MDM: CPT

## 2022-03-07 PROCEDURE — 81001 URINALYSIS AUTO W/SCOPE: CPT

## 2022-03-07 PROCEDURE — 87186 SC STD MICRODIL/AGAR DIL: CPT

## 2022-03-07 PROCEDURE — 87086 URINE CULTURE/COLONY COUNT: CPT

## 2022-03-07 RX ORDER — ACETAMINOPHEN 500 MG
650 TABLET ORAL ONCE
Refills: 0 | Status: COMPLETED | OUTPATIENT
Start: 2022-03-07 | End: 2022-03-07

## 2022-03-07 RX ADMIN — Medication 650 MILLIGRAM(S): at 00:53

## 2022-03-07 NOTE — ED PROVIDER NOTE - NS ED ROS FT
General: denies fever, chills  HENT: denies nasal congestion, rhinorrhea  Eyes: denies visual changes, blurred vision  CV: denies chest pain, palpitations  Resp: denies difficulty breathing, cough  Abdominal: denies nausea, vomiting, diarrhea, abdominal pain  : denies urinary pain or discharge, +right groin burning pain.  MSK: denies muscle aches, leg swelling  Neuro: denies headaches, numbness, tingling  Skin: denies rashes, bruises

## 2022-03-07 NOTE — ED ADULT NURSE NOTE - OBJECTIVE STATEMENT
71M w/ h/o BPH, CKD, suprapubic catheter, recurrent UTIs, presents with 2 days of burning pain in right groin. Patient states pain is "under the skin" of his right groin, does not radiate, is relieved with laying down. 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

## 2022-03-07 NOTE — ED PROVIDER NOTE - PHYSICAL EXAMINATION
GENERAL: well appearing in no acute distress, non-toxic appearing  HEAD: normocephalic, atraumatic  HEENT: normal conjunctiva, oral mucosa moist, uvula midline, no tonsilar exudates, no JVD  CARDIAC: regular rate and rhythm, normal S1S2, no appreciable murmurs, 2+ pulses in UE/LE b/l  PULM: normal breath sounds, clear to ascultation bilaterally, no rales, rhonchi, wheezing  GI: Abd soft, nondistended, nontender, no rebound tenderness, no guarding, no rigidity  : no CVA tenderness b/l, no suprapubic tenderness  NEURO: no focal motor or sensory deficits, CN2-12 intact, normal speech, PERRLA, EOMI, normal gait, AAOx3  MSK: right groin non-tender with no masses or overlying skin changes, ROM intact, no peripheral edema, no calf tenderness b/l  SKIN: well-perfused, extremities warm, no visible rashes  PSYCH: appropriate mood and affect

## 2022-03-07 NOTE — ED PROVIDER NOTE - PATIENT PORTAL LINK FT
You can access the FollowMyHealth Patient Portal offered by [facility] by registering at the following website: [email or web address]. By joining [employer]’s FollowMyHealth portal, you will also be able to view your health information using other applications (apps) compatible with our system.

## 2022-03-07 NOTE — ED PROVIDER NOTE - PROGRESS NOTE DETAILS
Patient refusing blood work, xray, CT. States wants an MRI, we discussed the lack of MRI indication at this time. Patient is agreeable to sending urinalysis and culture as well as taking tylenol.

## 2022-03-07 NOTE — ED PROVIDER NOTE - NSICDXPASTMEDICALHX_GEN_ALL_CORE_FT
Called and let VM for patient to return call. This is the third VM with no return call. It is noted that patient has had follow up for the same issue at a [facility] facility on 10/28/21. PAST MEDICAL HISTORY:  Anemia Transfused 3/2015    Back pain     BPH (benign prostatic hyperplasia)     Epidural abscess 3/2015 MRSA  admitted & treated  Patient was managed with Epidural injection for pain Prior to MRSA    Other hydronephrosis Renal insuffiencey    Spinal stenosis     UTI (urinary tract infection) With Colonization of Bacteria

## 2022-03-07 NOTE — ED PROVIDER NOTE - CLINICAL SUMMARY MEDICAL DECISION MAKING FREE TEXT BOX
71M w/ BPH, CKD5, suprapubic catheter, recurrent UTIs, who p/w 2 days of burning R groin pain, non-radiating, releived with laying down, only mild pain currently. AVSS, right groin non-tender with no masses or overlying skin changes. Likely MSK vs Gas pains, vs UTI. Will send labs, give pain meds, and reassess.

## 2022-03-07 NOTE — ED PROVIDER NOTE - OBJECTIVE STATEMENT
71M w/ h/o BPH, CKD, suprapubic catheter, recurrent UTIs, presents with 2 days of burning pain in right groin. Patient states pain is "under the skin" of his right groin, does not radiate, is relieved with laying down. 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 Patient states pain is "under the skin" of his right groin, does not radiate, is relieved with laying down. 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 Patient had recent bloodwork drawn 2 days ago which he reports he had a normal WCC, and a Hb of 9.7. He is physically active and reports biking and weight lifting. Denies trauma, abd pain, N/V, hematuria, fever/chills. 71M w/ h/o BPH, CKD, suprapubic catheter, recurrent UTIs, presents with 2 days of burning pain in right groin. Patient states pain is "under the skin" of his right groin, does not radiate, is relieved with laying down. 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 Patient had recent bloodwork drawn 2 days ago which he reports he had a normal WCC, and a Hb of 9.7. He is physically active and reports biking and weight lifting. Denies trauma, abd pain, N/V, hematuria, fever/chills.    Attendiny5o male presents with burning sensation in the right groin area.  no fever or chills.  no rash.  pain is now resolved.  no fever.

## 2022-03-07 NOTE — ED PROVIDER NOTE - NSFOLLOWUPINSTRUCTIONS_ED_ALL_ED_FT
Urinary Tract Infection    A urinary tract infection (UTI) is an infection of any part of the urinary tract, which includes the kidneys, ureters, bladder, and urethra. Risk factors include ignoring your need to urinate, wiping back to front if female, being an uncircumcised male, and having diabetes or a weak immune system. Symptoms include frequent urination, pain or burning with urination, foul smelling urine, cloudy urine, pain in the lower abdomen, blood in the urine, and fever. If you were prescribed an antibiotic medicine, take it as told by your health care provider. Do not stop taking the antibiotic even if you start to feel better.    SEEK IMMEDIATE MEDICAL CARE IF YOU HAVE ANY OF THE FOLLOWING SYMPTOMS: severe back or abdominal pain, fever, inability to keep fluids or medicine down, dizziness/lightheadedness, or a change in mental status.    A urine culture was sent and takes up to 48 hours to result, our hospital will reach out to you with the results and if necessary will send a prescription for antibiotics.

## 2022-03-09 LAB
-  AMIKACIN: SIGNIFICANT CHANGE UP
-  AMIKACIN: SIGNIFICANT CHANGE UP
-  AMOXICILLIN/CLAVULANIC ACID: SIGNIFICANT CHANGE UP
-  AMOXICILLIN/CLAVULANIC ACID: SIGNIFICANT CHANGE UP
-  AMPICILLIN/SULBACTAM: SIGNIFICANT CHANGE UP
-  AMPICILLIN/SULBACTAM: SIGNIFICANT CHANGE UP
-  AMPICILLIN: SIGNIFICANT CHANGE UP
-  AMPICILLIN: SIGNIFICANT CHANGE UP
-  AZTREONAM: SIGNIFICANT CHANGE UP
-  AZTREONAM: SIGNIFICANT CHANGE UP
-  CEFAZOLIN: SIGNIFICANT CHANGE UP
-  CEFAZOLIN: SIGNIFICANT CHANGE UP
-  CEFEPIME: SIGNIFICANT CHANGE UP
-  CEFEPIME: SIGNIFICANT CHANGE UP
-  CEFOXITIN: SIGNIFICANT CHANGE UP
-  CEFOXITIN: SIGNIFICANT CHANGE UP
-  CEFTRIAXONE: SIGNIFICANT CHANGE UP
-  CEFTRIAXONE: SIGNIFICANT CHANGE UP
-  CIPROFLOXACIN: SIGNIFICANT CHANGE UP
-  CIPROFLOXACIN: SIGNIFICANT CHANGE UP
-  ERTAPENEM: SIGNIFICANT CHANGE UP
-  ERTAPENEM: SIGNIFICANT CHANGE UP
-  GENTAMICIN: SIGNIFICANT CHANGE UP
-  GENTAMICIN: SIGNIFICANT CHANGE UP
-  IMIPENEM: SIGNIFICANT CHANGE UP
-  IMIPENEM: SIGNIFICANT CHANGE UP
-  LEVOFLOXACIN: SIGNIFICANT CHANGE UP
-  LEVOFLOXACIN: SIGNIFICANT CHANGE UP
-  MEROPENEM: SIGNIFICANT CHANGE UP
-  MEROPENEM: SIGNIFICANT CHANGE UP
-  NITROFURANTOIN: SIGNIFICANT CHANGE UP
-  NITROFURANTOIN: SIGNIFICANT CHANGE UP
-  PIPERACILLIN/TAZOBACTAM: SIGNIFICANT CHANGE UP
-  PIPERACILLIN/TAZOBACTAM: SIGNIFICANT CHANGE UP
-  TIGECYCLINE: SIGNIFICANT CHANGE UP
-  TIGECYCLINE: SIGNIFICANT CHANGE UP
-  TOBRAMYCIN: SIGNIFICANT CHANGE UP
-  TOBRAMYCIN: SIGNIFICANT CHANGE UP
-  TRIMETHOPRIM/SULFAMETHOXAZOLE: SIGNIFICANT CHANGE UP
-  TRIMETHOPRIM/SULFAMETHOXAZOLE: SIGNIFICANT CHANGE UP
METHOD TYPE: SIGNIFICANT CHANGE UP
METHOD TYPE: SIGNIFICANT CHANGE UP

## 2022-03-09 NOTE — ED POST DISCHARGE NOTE - ADDITIONAL DOCUMENTATION
3/15: Pt called ED requesting UCX results. Advised Cx w/ Ecoli, Citrobacter, Enterococcus and Pseudomonas. Reviewed chart, pt with old RX for Cipro. Pt does not have active RX and denies taking abx at home. All bacteria other than ecoli sens to Cipro (ecoli w/ indeterminate sens). Given pseudomonas only has oral sens to Cipro advised pt will send rx to his pharmcay. Pt with CKD, last Cr 3.18 in 2020 CrCl <30 so will need renal dosing. Rx Cipro ER 500mg qd x 5 days sent to pharmacy. - Ayaka Esposito PA-C

## 2022-03-09 NOTE — ED POST DISCHARGE NOTE - DETAILS
3/9/22: pt taking cipro. bacteria sensitive to cipro. No further intervention necessary. Merlyn Cool PA-C

## 2022-03-10 LAB
-  AMIKACIN: SIGNIFICANT CHANGE UP
-  AZTREONAM: SIGNIFICANT CHANGE UP
-  CEFEPIME: SIGNIFICANT CHANGE UP
-  CEFTAZIDIME: SIGNIFICANT CHANGE UP
-  CIPROFLOXACIN: SIGNIFICANT CHANGE UP
-  GENTAMICIN: SIGNIFICANT CHANGE UP
-  IMIPENEM: SIGNIFICANT CHANGE UP
-  LEVOFLOXACIN: SIGNIFICANT CHANGE UP
-  MEROPENEM: SIGNIFICANT CHANGE UP
-  PIPERACILLIN/TAZOBACTAM: SIGNIFICANT CHANGE UP
-  TOBRAMYCIN: SIGNIFICANT CHANGE UP
METHOD TYPE: SIGNIFICANT CHANGE UP

## 2022-03-12 LAB
-  AMPICILLIN: SIGNIFICANT CHANGE UP
-  CIPROFLOXACIN: SIGNIFICANT CHANGE UP
-  LEVOFLOXACIN: SIGNIFICANT CHANGE UP
-  NITROFURANTOIN: SIGNIFICANT CHANGE UP
-  TETRACYCLINE: SIGNIFICANT CHANGE UP
-  VANCOMYCIN: SIGNIFICANT CHANGE UP
CULTURE RESULTS: SIGNIFICANT CHANGE UP
METHOD TYPE: SIGNIFICANT CHANGE UP
ORGANISM # SPEC MICROSCOPIC CNT: SIGNIFICANT CHANGE UP
SPECIMEN SOURCE: SIGNIFICANT CHANGE UP

## 2022-03-15 RX ORDER — CIPROFLOXACIN LACTATE 400MG/40ML
1 VIAL (ML) INTRAVENOUS
Qty: 5 | Refills: 0
Start: 2022-03-15 | End: 2022-03-19

## 2022-04-05 ENCOUNTER — APPOINTMENT (OUTPATIENT)
Dept: NEUROLOGY | Facility: CLINIC | Age: 72
End: 2022-04-05
Payer: MEDICARE

## 2022-04-05 PROCEDURE — 99213 OFFICE O/P EST LOW 20 MIN: CPT | Mod: 95

## 2022-04-05 NOTE — REVIEW OF SYSTEMS
[Back Pain] : ~T back pain [As Noted in HPI] : as noted in HPI [Limb Weakness] : limb weakness [Negative] : Heme/Lymph [de-identified] : left foot drop

## 2022-04-05 NOTE — ASSESSMENT
[Opioids] : Patient was explained in detail about pain control by using opioids. Patient has signed and fully understands our guidelines for medication and drug screening.  Patient understands the side effects of opioids, including, but not limited to, drug tolerance, dependence, potential for addiction. This class of drugs is habit-forming and [place] regulated. The sedative effects of opioids can be potentiated by taking alcohol or any sleeping pills, along with opioids. The decision to drive is patient’s responsibility, as opioids can affect his/her driving ability and ability to concentrate. The long-term place is not clear, however, patient understands that once the pain control optimizes, the goal will be to wean off the opioids. All the issues regarding opioid treatment have been addressed satisfactorily.  [FreeTextEntry1] : 70 y/o M CKD stage 5 with suprapubic cath with Lumbar epidural abscess with hypotonic bladder and left foot drop with chronic lower back pain. ? candidate for renal transplant eval pending at [facility]. \par \par Oxycodone 7.5/325 mg 3-4x/day qty 100 \par I-Stop reviewed,  reference #: [id number]\par Reluctant to Restart PT \par Case to be d/w Pain team. \par No signs of aberrant behavior and will continue to monitor for signs of toxicity.\par Reminded to continue to avoid alcohol.\par \par

## 2022-04-05 NOTE — HISTORY OF PRESENT ILLNESS
[FreeTextEntry1] : 70 y/o M CKD stage 5 with Lumbar epidural abscess with hypotonic bladder and left foot drop with chronic lower back pain who is being seen via TEB for follow up. Last month had ER visit for RLQ abdominal pain and is being w/u as outpatient.  His back pain is constant lower back lower back 9-10/10 down to 1-2/10 with Percocet 7.5/325 mg and gradually increases as the night goes on.\par \par Denies AE, + constipation on occasion but normal BM \par \par Current meds: Oxycodone 7.5/325 mg 3x/day.

## 2022-04-07 ENCOUNTER — APPOINTMENT (OUTPATIENT)
Dept: ORTHOPEDIC SURGERY | Facility: CLINIC | Age: 72
End: 2022-04-07
Payer: MEDICARE

## 2022-04-07 VITALS
DIASTOLIC BLOOD PRESSURE: 78 MMHG | SYSTOLIC BLOOD PRESSURE: 155 MMHG | HEART RATE: 71 BPM | BODY MASS INDEX: 20.61 KG/M2 | HEIGHT: 68 IN | WEIGHT: 136 LBS

## 2022-04-07 DIAGNOSIS — M25.551 PAIN IN RIGHT HIP: ICD-10-CM

## 2022-04-07 PROCEDURE — 99214 OFFICE O/P EST MOD 30 MIN: CPT

## 2022-04-07 RX ORDER — CALCITRIOL 0.25 UG/1
0.25 CAPSULE, LIQUID FILLED ORAL
Qty: 90 | Refills: 0 | Status: ACTIVE | COMMUNITY
Start: 2022-03-29

## 2022-04-07 RX ORDER — CIPROFLOXACIN HYDROCHLORIDE 500 MG/1
500 TABLET, FILM COATED ORAL
Qty: 5 | Refills: 0 | Status: ACTIVE | COMMUNITY
Start: 2022-03-15

## 2022-04-13 ENCOUNTER — TRANSCRIPTION ENCOUNTER (OUTPATIENT)
Age: 72
End: 2022-04-13

## 2022-04-21 ENCOUNTER — APPOINTMENT (OUTPATIENT)
Dept: MRI IMAGING | Facility: HOSPITAL | Age: 72
End: 2022-04-21
Payer: MEDICARE

## 2022-04-21 ENCOUNTER — OUTPATIENT (OUTPATIENT)
Dept: OUTPATIENT SERVICES | Facility: HOSPITAL | Age: 72
LOS: 1 days | End: 2022-04-21
Payer: MEDICARE

## 2022-04-21 DIAGNOSIS — G06.2 EXTRADURAL AND SUBDURAL ABSCESS, UNSPECIFIED: Chronic | ICD-10-CM

## 2022-04-21 DIAGNOSIS — M25.551 PAIN IN RIGHT HIP: ICD-10-CM

## 2022-04-21 PROCEDURE — 72195 MRI PELVIS W/O DYE: CPT

## 2022-04-21 PROCEDURE — 72195 MRI PELVIS W/O DYE: CPT | Mod: 26

## 2022-05-03 ENCOUNTER — APPOINTMENT (OUTPATIENT)
Dept: PAIN MANAGEMENT | Facility: CLINIC | Age: 72
End: 2022-05-03

## 2022-05-04 ENCOUNTER — APPOINTMENT (OUTPATIENT)
Dept: CARDIOLOGY | Facility: CLINIC | Age: 72
End: 2022-05-04
Payer: MEDICARE

## 2022-05-04 ENCOUNTER — NON-APPOINTMENT (OUTPATIENT)
Age: 72
End: 2022-05-04

## 2022-05-04 VITALS
WEIGHT: 138 LBS | BODY MASS INDEX: 20.98 KG/M2 | DIASTOLIC BLOOD PRESSURE: 60 MMHG | OXYGEN SATURATION: 98 % | SYSTOLIC BLOOD PRESSURE: 122 MMHG | HEART RATE: 63 BPM

## 2022-05-04 DIAGNOSIS — N18.4 CHRONIC KIDNEY DISEASE, STAGE 4 (SEVERE): ICD-10-CM

## 2022-05-04 DIAGNOSIS — D63.1 CHRONIC KIDNEY DISEASE, STAGE 4 (SEVERE): ICD-10-CM

## 2022-05-04 PROCEDURE — 93000 ELECTROCARDIOGRAM COMPLETE: CPT

## 2022-05-04 PROCEDURE — 99204 OFFICE O/P NEW MOD 45 MIN: CPT

## 2022-05-04 NOTE — DISCUSSION/SUMMARY
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  He is asymptomatic.  His exam shows regular rhythm, normal blood pressure, clear lungs, and a normal cardiac exam.  An EKG is within normal limits.\par \par He will be scheduled for an echo and stress echo.  Assuming these are unremarkable he will be cleared to proceed with transplant.

## 2022-05-06 ENCOUNTER — APPOINTMENT (OUTPATIENT)
Dept: PAIN MANAGEMENT | Facility: CLINIC | Age: 72
End: 2022-05-06
Payer: MEDICARE

## 2022-05-06 VITALS
HEART RATE: 56 BPM | BODY MASS INDEX: 20.92 KG/M2 | DIASTOLIC BLOOD PRESSURE: 82 MMHG | HEIGHT: 68 IN | WEIGHT: 138 LBS | SYSTOLIC BLOOD PRESSURE: 138 MMHG

## 2022-05-06 PROCEDURE — 99213 OFFICE O/P EST LOW 20 MIN: CPT

## 2022-05-06 NOTE — HISTORY OF PRESENT ILLNESS
[FreeTextEntry1] : 70 y/o M CKD stage 5 with Lumbar epidural abscess with hypotonic bladder and left foot drop with chronic lower back pain who is being seen for follow up.  Chronic lower back pain is constant occasionally radiating down LLE to buttock , 7-10/10 without meds but down to 1-2/10 with meds.  Pain well controlled with current meds. \par \par Denies AE, + constipation on occasion but normal BM \par \par Current meds: Oxycodone 7.5/325 mg 3x/day.

## 2022-05-06 NOTE — ASSESSMENT
[Opioids] : Patient was explained in detail about pain control by using opioids. Patient has signed and fully understands our guidelines for medication and drug screening.  Patient understands the side effects of opioids, including, but not limited to, drug tolerance, dependence, potential for addiction. This class of drugs is habit-forming and [place] regulated. The sedative effects of opioids can be potentiated by taking alcohol or any sleeping pills, along with opioids. The decision to drive is patient’s responsibility, as opioids can affect his/her driving ability and ability to concentrate. The long-term place is not clear, however, patient understands that once the pain control optimizes, the goal will be to wean off the opioids. All the issues regarding opioid treatment have been addressed satisfactorily.  [FreeTextEntry1] : 70 y/o M CKD stage 5 with suprapubic cath with Lumbar epidural abscess with hypotonic bladder and left foot drop with chronic lower back pain. ? candidate for renal transplant eval pending at [facility]. \par \par Oxycodone 7.5/325 mg 3-4x/day qty 100 \par I-Stop reviewed,  reference #: [id number]\par Reluctant to Restart PT \par No signs of aberrant behavior and will continue to monitor for signs of toxicity.\par Reminded to continue to avoid alcohol.\par \par

## 2022-05-06 NOTE — REVIEW OF SYSTEMS
[Back Pain] : ~T back pain [As Noted in HPI] : as noted in HPI [Limb Weakness] : limb weakness [Negative] : Heme/Lymph [de-identified] : left foot drop

## 2022-05-31 ENCOUNTER — APPOINTMENT (OUTPATIENT)
Dept: CARDIOLOGY | Facility: CLINIC | Age: 72
End: 2022-05-31
Payer: MEDICARE

## 2022-05-31 PROCEDURE — 93306 TTE W/DOPPLER COMPLETE: CPT | Mod: 59

## 2022-05-31 PROCEDURE — 93351 STRESS TTE COMPLETE: CPT

## 2022-06-03 ENCOUNTER — APPOINTMENT (OUTPATIENT)
Dept: PAIN MANAGEMENT | Facility: CLINIC | Age: 72
End: 2022-06-03
Payer: MEDICARE

## 2022-06-03 PROCEDURE — 99213 OFFICE O/P EST LOW 20 MIN: CPT | Mod: 95

## 2022-06-03 NOTE — REASON FOR VISIT
[Home] : at home, [unfilled] , at the time of the visit. [Medical Office: ([facility])___] : at the medical office located in  [This encounter was initiated by telehealth (audio with video) and converted to telephone (audio only) due to technical difficulties.] : This encounter was initiated by telehealth (audio with video) and converted to telephone (audio only) due to technical difficulties.

## 2022-06-03 NOTE — ASSESSMENT
[FreeTextEntry1] : No changes today . \par We will continue to monitor ISTOP and for safety . \par RTO 1 month

## 2022-06-03 NOTE — HISTORY OF PRESENT ILLNESS
[FreeTextEntry1] : 70 y/o M CKD stage 5 with Lumbar epidural abscess with hypotonic bladder and left foot drop with chronic lower back pain who is being seen for follow up.  Chronic lower back pain is constant occasionally radiating down LLE to buttock ,2- 7-10/10 without meds but down to 1-2/10 with meds.  Pain well controlled with current meds. \par \par Denies AE, + constipation on occasion but normal BM \par \par Current meds: Oxycodone 7.5/325 mg 3x/day. \par \par Pt scheduled f or an abdominal hernia repair in 3 weeks \par \par ISTOP#[id number] [____ / 10] : [unfilled]/10 [] : No

## 2022-06-10 ENCOUNTER — OUTPATIENT (OUTPATIENT)
Dept: OUTPATIENT SERVICES | Facility: HOSPITAL | Age: 72
LOS: 1 days | End: 2022-06-10
Payer: MEDICARE

## 2022-06-10 VITALS
SYSTOLIC BLOOD PRESSURE: 132 MMHG | TEMPERATURE: 98 F | OXYGEN SATURATION: 97 % | RESPIRATION RATE: 16 BRPM | HEIGHT: 68 IN | WEIGHT: 134.92 LBS | HEART RATE: 67 BPM | DIASTOLIC BLOOD PRESSURE: 87 MMHG

## 2022-06-10 DIAGNOSIS — N18.9 CHRONIC KIDNEY DISEASE, UNSPECIFIED: ICD-10-CM

## 2022-06-10 DIAGNOSIS — Z98.890 OTHER SPECIFIED POSTPROCEDURAL STATES: Chronic | ICD-10-CM

## 2022-06-10 DIAGNOSIS — K40.90 UNILATERAL INGUINAL HERNIA, WITHOUT OBSTRUCTION OR GANGRENE, NOT SPECIFIED AS RECURRENT: ICD-10-CM

## 2022-06-10 DIAGNOSIS — G06.2 EXTRADURAL AND SUBDURAL ABSCESS, UNSPECIFIED: Chronic | ICD-10-CM

## 2022-06-10 DIAGNOSIS — K40.31 UNILATERAL INGUINAL HERNIA, WITH OBSTRUCTION, WITHOUT GANGRENE, RECURRENT: ICD-10-CM

## 2022-06-10 DIAGNOSIS — Z01.818 ENCOUNTER FOR OTHER PREPROCEDURAL EXAMINATION: ICD-10-CM

## 2022-06-10 LAB
A1C WITH ESTIMATED AVERAGE GLUCOSE RESULT: 5.6 % — SIGNIFICANT CHANGE UP (ref 4–5.6)
ANION GAP SERPL CALC-SCNC: 12 MMOL/L — SIGNIFICANT CHANGE UP (ref 5–17)
BUN SERPL-MCNC: 42 MG/DL — HIGH (ref 7–23)
CALCIUM SERPL-MCNC: 11.5 MG/DL — HIGH (ref 8.4–10.5)
CHLORIDE SERPL-SCNC: 102 MMOL/L — SIGNIFICANT CHANGE UP (ref 96–108)
CO2 SERPL-SCNC: 26 MMOL/L — SIGNIFICANT CHANGE UP (ref 22–31)
CREAT SERPL-MCNC: 2.76 MG/DL — HIGH (ref 0.5–1.3)
EGFR: 24 ML/MIN/1.73M2 — LOW
ESTIMATED AVERAGE GLUCOSE: 114 MG/DL — SIGNIFICANT CHANGE UP (ref 68–114)
GLUCOSE SERPL-MCNC: 85 MG/DL — SIGNIFICANT CHANGE UP (ref 70–99)
HCT VFR BLD CALC: 36.7 % — LOW (ref 39–50)
HGB BLD-MCNC: 11.9 G/DL — LOW (ref 13–17)
MCHC RBC-ENTMCNC: 29.2 PG — SIGNIFICANT CHANGE UP (ref 27–34)
MCHC RBC-ENTMCNC: 32.4 GM/DL — SIGNIFICANT CHANGE UP (ref 32–36)
MCV RBC AUTO: 90 FL — SIGNIFICANT CHANGE UP (ref 80–100)
NRBC # BLD: 0 /100 WBCS — SIGNIFICANT CHANGE UP (ref 0–0)
PLATELET # BLD AUTO: 289 K/UL — SIGNIFICANT CHANGE UP (ref 150–400)
POTASSIUM SERPL-MCNC: 5.1 MMOL/L — SIGNIFICANT CHANGE UP (ref 3.5–5.3)
POTASSIUM SERPL-SCNC: 5.1 MMOL/L — SIGNIFICANT CHANGE UP (ref 3.5–5.3)
RBC # BLD: 4.08 M/UL — LOW (ref 4.2–5.8)
RBC # FLD: 14 % — SIGNIFICANT CHANGE UP (ref 10.3–14.5)
SODIUM SERPL-SCNC: 140 MMOL/L — SIGNIFICANT CHANGE UP (ref 135–145)
WBC # BLD: 8.65 K/UL — SIGNIFICANT CHANGE UP (ref 3.8–10.5)
WBC # FLD AUTO: 8.65 K/UL — SIGNIFICANT CHANGE UP (ref 3.8–10.5)

## 2022-06-10 PROCEDURE — 36415 COLL VENOUS BLD VENIPUNCTURE: CPT

## 2022-06-10 PROCEDURE — G0463: CPT

## 2022-06-10 PROCEDURE — 80048 BASIC METABOLIC PNL TOTAL CA: CPT

## 2022-06-10 PROCEDURE — 85027 COMPLETE CBC AUTOMATED: CPT

## 2022-06-10 PROCEDURE — 83036 HEMOGLOBIN GLYCOSYLATED A1C: CPT

## 2022-06-10 NOTE — H&P PST ADULT - HISTORY OF PRESENT ILLNESS
65 y old male PMH BPH & retention of urine insertion Henry cath 3/2015 last change 1 month ago scheduled for insertion of Suprapubic cathter 71 yr old M with history of chronic back pain, s/p epidural injections, epidural abscess, BPH, MRSA bacteremia secondary to UTI, ARF s/p suprapubic catheter placement, anemia , underwent lumbar laminectomy for epidural abscess  2015. Since 2015 doing well, Playing tennis almost daily. Follow with nephrologist and urologist. Patient is undergoing kidney transplant evaluation @ [facility]. Baseline creatinine 3.0-4.0.  Patient found to have right inguinal hernia. Presents to PST for scheduled repair on 6/17/2022. Denies fever, chills, no acute complaints. Denies Covid contacts. Covid PCR 6/14/2022 @ [facility].

## 2022-06-10 NOTE — H&P PST ADULT - OTHER CARE PROVIDERS
nephrologist Dr. Angélica Sow and cardiologist Dr. Marcos Frank  urologist Dr. Martínez [phone] nephrologist Dr. Simon Sow [phone],  cardiologist Dr. Marcos Frank ( [facility] ) last visit 5/4/2022 ,  urologist Dr. Martínez [phone]

## 2022-06-10 NOTE — H&P PST ADULT - PROBLEM SELECTOR PLAN 1
Laparoscopic right inguinal hernia repair   PST instructions provided, surgical scrub given, patient verbalized understanding   CBC, BMP, hgA1c collected and sent  Cardiology note and test result on chart

## 2022-06-10 NOTE — H&P PST ADULT - PROBLEM SELECTOR PLAN 2
Will obtain nephrology note , patient stated he had recent preop visit Will obtain nephrology note , patient stated he had recent preop visit  Will hold Farxiga 3 days prior surgery

## 2022-06-10 NOTE — H&P PST ADULT - GENITOURINARY COMMENTS
suprapubic catheter suprapubic catheter in place chronic suprapubic catheter, CKD, undergoing kidney transplant evaluation

## 2022-06-10 NOTE — H&P PST ADULT - NSICDXPASTMEDICALHX_GEN_ALL_CORE_FT
PAST MEDICAL HISTORY:  2019 novel coronavirus disease (COVID-19) 3/2022 weak and tired , no hospitalization    Anemia of chronic disease epogen 3/2022  ( history of blood transfusion 2015)    Back pain Chronic    BPH (benign prostatic hyperplasia)     Epidural abscess 3/2015 MRSA  admitted & treated  Patient was managed with Epidural injection for pain Prior to MRSA    Hypertension     Spinal stenosis     UTI (urinary tract infection) With Colonization of Bacteria - asymptomatic     PAST MEDICAL HISTORY:  2019 novel coronavirus disease (COVID-19) 3/2022 weak and tired , no hospitalization    Anemia of chronic disease epogen 3/2022  ( history of blood transfusion 2015)    Back pain Chronic    BPH (benign prostatic hyperplasia) urinary retention , complicated UTI , MRSA bacteremia s/p suprapubic catheter    Chronic kidney disease (CKD) baseline creatinine 3.0-4.0, undergoing evaluation for kidney transplant @ [facility] and [place]    Epidural abscess 3/2015 MRSA  admitted & treated  Patient was managed with Epidural injection for pain Prior to MRSA    History of MRSA infection bacteremia 2015  Acute Renal failure at that time  [facility]    Hypertension     Spinal stenosis     UTI (urinary tract infection) With Colonization of Bacteria - asymptomatic

## 2022-06-10 NOTE — H&P PST ADULT - NSICDXPASTSURGICALHX_GEN_ALL_CORE_FT
PAST SURGICAL HISTORY:  Epidural abscess Surgical intervention  3/2015    History of bladder surgery 12/30/2015 suprapubic catheter changed q 4-5 weeks by urology , last exchange 5/20/2022    History of colonoscopy 2021    History of lumbar laminectomy 3/2015 due to MRSA infection disc removed

## 2022-06-14 ENCOUNTER — OUTPATIENT (OUTPATIENT)
Dept: OUTPATIENT SERVICES | Facility: HOSPITAL | Age: 72
LOS: 1 days | End: 2022-06-14
Payer: MEDICARE

## 2022-06-14 DIAGNOSIS — G06.2 EXTRADURAL AND SUBDURAL ABSCESS, UNSPECIFIED: Chronic | ICD-10-CM

## 2022-06-14 DIAGNOSIS — Z98.890 OTHER SPECIFIED POSTPROCEDURAL STATES: Chronic | ICD-10-CM

## 2022-06-14 DIAGNOSIS — Z11.52 ENCOUNTER FOR SCREENING FOR COVID-19: ICD-10-CM

## 2022-06-14 LAB — SARS-COV-2 RNA SPEC QL NAA+PROBE: SIGNIFICANT CHANGE UP

## 2022-06-14 PROCEDURE — U0003: CPT

## 2022-06-14 PROCEDURE — U0005: CPT

## 2022-06-14 PROCEDURE — C9803: CPT

## 2022-06-15 ENCOUNTER — TRANSCRIPTION ENCOUNTER (OUTPATIENT)
Age: 72
End: 2022-06-15

## 2022-06-16 ENCOUNTER — TRANSCRIPTION ENCOUNTER (OUTPATIENT)
Age: 72
End: 2022-06-16

## 2022-06-17 ENCOUNTER — TRANSCRIPTION ENCOUNTER (OUTPATIENT)
Age: 72
End: 2022-06-17

## 2022-06-17 ENCOUNTER — OUTPATIENT (OUTPATIENT)
Dept: OUTPATIENT SERVICES | Facility: HOSPITAL | Age: 72
LOS: 1 days | End: 2022-06-17
Payer: MEDICARE

## 2022-06-17 VITALS
WEIGHT: 134.92 LBS | OXYGEN SATURATION: 99 % | HEIGHT: 68 IN | HEART RATE: 63 BPM | DIASTOLIC BLOOD PRESSURE: 70 MMHG | RESPIRATION RATE: 18 BRPM | TEMPERATURE: 97 F | SYSTOLIC BLOOD PRESSURE: 138 MMHG

## 2022-06-17 VITALS
TEMPERATURE: 97 F | OXYGEN SATURATION: 100 % | HEART RATE: 66 BPM | RESPIRATION RATE: 16 BRPM | SYSTOLIC BLOOD PRESSURE: 152 MMHG | DIASTOLIC BLOOD PRESSURE: 70 MMHG

## 2022-06-17 DIAGNOSIS — K40.31 UNILATERAL INGUINAL HERNIA, WITH OBSTRUCTION, WITHOUT GANGRENE, RECURRENT: ICD-10-CM

## 2022-06-17 DIAGNOSIS — Z98.890 OTHER SPECIFIED POSTPROCEDURAL STATES: Chronic | ICD-10-CM

## 2022-06-17 DIAGNOSIS — G06.2 EXTRADURAL AND SUBDURAL ABSCESS, UNSPECIFIED: Chronic | ICD-10-CM

## 2022-06-17 LAB
POTASSIUM SERPL-MCNC: 4.8 MMOL/L — SIGNIFICANT CHANGE UP (ref 3.5–5.3)
POTASSIUM SERPL-SCNC: 4.8 MMOL/L — SIGNIFICANT CHANGE UP (ref 3.5–5.3)

## 2022-06-17 PROCEDURE — 49650 LAP ING HERNIA REPAIR INIT: CPT | Mod: RT

## 2022-06-17 PROCEDURE — C1781: CPT

## 2022-06-17 PROCEDURE — C9399: CPT

## 2022-06-17 PROCEDURE — 84132 ASSAY OF SERUM POTASSIUM: CPT

## 2022-06-17 DEVICE — DEVICE SORBAFIX ABSORBABLE FIXATION 30 DISP: Type: IMPLANTABLE DEVICE | Site: RIGHT | Status: FUNCTIONAL

## 2022-06-17 DEVICE — MESH HERNIA INGUINAL PROGRIP LAPAROSCOPIC 15 X 10CM: Type: IMPLANTABLE DEVICE | Site: RIGHT | Status: FUNCTIONAL

## 2022-06-17 RX ORDER — OXYCODONE HYDROCHLORIDE 5 MG/1
1 TABLET ORAL
Qty: 10 | Refills: 0
Start: 2022-06-17

## 2022-06-17 RX ORDER — CHLORHEXIDINE GLUCONATE 213 G/1000ML
1 SOLUTION TOPICAL ONCE
Refills: 0 | Status: COMPLETED | OUTPATIENT
Start: 2022-06-17 | End: 2022-06-17

## 2022-06-17 RX ORDER — OXYCODONE HYDROCHLORIDE 5 MG/1
5 TABLET ORAL ONCE
Refills: 0 | Status: DISCONTINUED | OUTPATIENT
Start: 2022-06-17 | End: 2022-06-17

## 2022-06-17 RX ORDER — ONDANSETRON 8 MG/1
4 TABLET, FILM COATED ORAL ONCE
Refills: 0 | Status: DISCONTINUED | OUTPATIENT
Start: 2022-06-17 | End: 2022-07-01

## 2022-06-17 RX ORDER — LIDOCAINE HCL 20 MG/ML
0.2 VIAL (ML) INJECTION ONCE
Refills: 0 | Status: COMPLETED | OUTPATIENT
Start: 2022-06-17 | End: 2022-06-17

## 2022-06-17 RX ORDER — CEFAZOLIN SODIUM 1 G
2000 VIAL (EA) INJECTION ONCE
Refills: 0 | Status: COMPLETED | OUTPATIENT
Start: 2022-06-17 | End: 2022-06-17

## 2022-06-17 RX ORDER — SODIUM CHLORIDE 9 MG/ML
1000 INJECTION INTRAMUSCULAR; INTRAVENOUS; SUBCUTANEOUS
Refills: 0 | Status: DISCONTINUED | OUTPATIENT
Start: 2022-06-17 | End: 2022-07-01

## 2022-06-17 RX ADMIN — SODIUM CHLORIDE 75 MILLILITER(S): 9 INJECTION INTRAMUSCULAR; INTRAVENOUS; SUBCUTANEOUS at 09:22

## 2022-06-17 RX ADMIN — CHLORHEXIDINE GLUCONATE 1 APPLICATION(S): 213 SOLUTION TOPICAL at 09:00

## 2022-06-17 NOTE — ASU PREOP CHECKLIST - PATIENT'S PERSONAL PROPERTY REMOVED
reading/glasses/contacts reading glasses, has contacts does not want to remove them, Dr. Andersen informed of same/glasses/contacts reading glasses, has contacts does not want to remove them, Dr. Andersen informed of same and aware/glasses/contacts

## 2022-06-17 NOTE — ASU DISCHARGE PLAN (ADULT/PEDIATRIC) - NURSING INSTRUCTIONS
OK to take Tylenol/Acetaminophen at 6:30 PM TODAY 6/17 for pain and every 6 hours after as needed.   ******************************************************************************************   Oxycodone also prescribed, PLEASE TAKE FOR SEVERE PAIN ONLY AS THIS MEDICATION CAN CAUSE CONSTIPATION. If taking narcotics, please take miralax 1x/day and colace 3x/day to prevent constipation. These medications can be obtained over the counter.

## 2022-06-17 NOTE — ASU PATIENT PROFILE, ADULT - FALL HARM RISK - UNIVERSAL INTERVENTIONS
Bed in lowest position, wheels locked, appropriate side rails in place/Call bell, personal items and telephone in reach/Instruct patient to call for assistance before getting out of bed or chair/Non-slip footwear when patient is out of bed/[place] to call system/Physically safe environment - no spills, clutter or unnecessary equipment/Purposeful Proactive Rounding/Room/bathroom lighting operational, light cord in reach

## 2022-06-17 NOTE — ASU DISCHARGE PLAN (ADULT/PEDIATRIC) - CARE PROVIDER_API CALL
Chapito Horner)  Surgery  [address], [address]  [place] [address]  Phone: [phone]  Fax: [phone]  Follow Up Time: Routine

## 2022-06-17 NOTE — ASU DISCHARGE PLAN (ADULT/PEDIATRIC) - PROVIDER TOKENS
[facility]  Palliative Care Consultation Note    Patient: Efe Huertas  Date of Admission:  10/29/2021          Recommendations       1)   GOALS OF CARE are .    Family meeting held today and the 3 children (Iliana, Melanie and ameya )     [facility]: Comfort care with hope to discharge to [facility] consult placed   Order placed to discontinue defib portion of pacer   Spoke with RNCM, she will check with [facility] regarding their covid vaccine requirement.   Patient is not vaccinated but children are willing to have him vaccinated. There may be a 2 week wait requirement for admission  per policy,  Family aware. They cannot take him home on Hospice   .      2)    ADVANCED CARE PLANNING  ? Patient has completed health care directive:  N  ? Surrogate  health care agent: Iliana is the family spokes  Person, brother Ameya and Sister Melanie are also decision makers. Their Is another son who has illness   ? Code Status: DNR DNI.        3)    SYMPTOM MANAGEMENT         Comfort care    Goal of keep respiratory rate <30, and eliminate signs of distress (grimacing, fear expression, agitation, heavy secretions, use of accessory muscles, nasal flaring, grunting at end-expiration)    If patient is conscious, encourage coughing    Stop BP checks, discontinue oximetry.    If fever suspected, ok to check temp and give PRN tylenol     Med regimen: GFR 17, do not use morphine due to metabolyte build up to avoid painful myoclonus  ? SL Dilaudid 0.5  q3hr PRN  for pain/dyspnea  ? SL  ativan 1 mg q6 hr PRN for anxiety/seizures  SL atropine 1%  PRN for secretions.  If refractory, then can use Glycopyrrolate 0.2mg IV q1h PRN    ? Dysphagia, due to  esophageal stenosis,,  ? Comment  dilated this admission  ? Recommedations        Dyspnea due to COPD  o Comment:    s/p bilateral thora consistent with transudates/CHF    Per pulmonary,  On o2 at home      Generalized weakness due to Multi organ issues  (renal, Cardiac, pulmonary, GI and myeloproliferative disorder).          4)    PSYCHOSOCIAL/SPIRITUAL SUPPORT  ? Will request spiritual care support  And Palliative SW support  ? Will continue with palliative social work support  ? Palliative medicine will continue to follow    These recommendations were discussed with .      Thank you for the opportunity to participate in the care of this patient and family. Our team: will continue to follow.     During regular M-F work hours -- if you are not sure who specifically to contact -- please contact us by calling us directly at the Palliative Care Main Line [phone]    After regular work hours and on weekends/holidays, you can leave a message at [phone]         Summary     Active Problems:    A-fib (H)    NICM (nonischemic cardiomyopathy) (H)    Pneumonia of both lungs due to infectious organism, unspecified part of lung    Chronic respiratory failure (H)    COPD, group B, by GOLD 2017 classification (H)    Leukocytosis, unspecified type    JERED (acute kidney injury) (H)    Proctitis    Myeloproliferative disorder (H)    Splenomegaly    Prostate cancer (H)    Polycythemia    JANETH (obstructive sleep apnea)        Efe Huertas is a 89 year old male with hx of COPD (on home O2), Afib (has  ICD,not anticoagulated due to h/o multiple falls in the past per cardiology note ),CHF, cardiomyopathy and obstructive airway disease who presents to this Emergency Department for evaluation of decreased appetite  . He endorsed whole body weakness and 2 falls in the past 2 months.       Recent Hospitalizations:  5/18 abd pain due to  Constipation     Interim Hx   Patient in multiorgan failure including kidneys, MDS, Cardia and Pulmonary systems   Refused Bipap last night, UO decreasing, GFR work, Hgb decreased. Hypotensive last night       Prognosis, Goals, & Planning:        Palliative Medicine workflow for Compassionate Exception to Visitor Policy  1. Notifed RN  "supervisor at [phone] for approval for family meeting and notified supervisor for  approval, so the patient is placed on the Compassionate Exception email list for the  staff.  2.  team sticky note in EMR,  COMPASSIONATE EXCEPTION TO VISITOR POLICY APPROVED so staff know quickly upon opening the chart it was granted.  3. \"compassionate exception to visitor policy  was requested and approved.\"         Goals of Care Discussion    Family conference today with one son and 2 daughters and PC DEV Araujo and Dr Jimenez  Updated them on medical status and how treatments are now causing more burden than benefit  I   discussed  Hospice services, the focus of care (comfort), care delivery (short  Visits by certified  providers (Hospice team) and where services are held (persons home, LTC or ASL hospice house   (latter two are private pay for room and board) and qualifying Dx.  Patient wife had been at [facility] on hospice. They would like comfort cares here and Hospice       11/8  I met with daughter Iliana and I Discussed role of Palliative Medicine referral (GOC, Symptom Management, Communication on current status, provide support and assist with explanation of cares to help them understand choices based on goals and patient's condition, etc).      While we were meeting the  Oncology PA came in..  She explained to daughter that number are improving and explained the Myeloproliferative disorder. Daughter asked about the polycythemia vera  And she explained both.   Hgb down today and RBC down. I explained to daughter if the Hgb drops less that 7, her Dad would not  Be a good candidate for a blood transfusion due to his issues with his kidneys.  She was having difficulty understanding the correlation between the kidneys and his heart and his overall declining status. She also needed detailed explanation of her Dad aspiration pneumonia and why he is NPO on the Bipap.  I tried to focus on what would mean a good " "quality of life for her dad, especially given how weak he was PTA.  I also introduced the concept of turning the defibrillator portion off since he is DNR DNI and explained that this firing would cause him more harm that good.  I explained that the pacer portion would remain on .    She was upset that her 2 siblings could not come in to visit with her. I explained  The Covid restrictions and she had  Hard time understanding that saying \"the people who are hospitalized all have Covid due to the vaccine not working and they have other health issues.\"  I indicated that the antivaxxers are the ones primarily hospitalized and the hospital has to protect the health of all by putting limitations on visiting.            Coping, Meaning, & Spirituality:         Mood, coping, and/or meaning in the context of serious illness were addressed today: Yes  Summary/Comments: daughter Iliana has poor  Health literacy     Social:      Home setting: lives in his own home, her brother has health issues and lives with him due to his own limitations  Marital Status not .   Support  at Home: Son lives with him and another son and  two daughters help. They cannot provide 24/7care  Prior Function: getting much weaker, family can no longer take care of him at home.    Family:3 children  Work Status:retired [employer] worker.       ROS        Key Palliative Symptom Data:  Pain   N  Dyspnea  mod  Nausea  N  Anxiety N           Comprehensive ROS is reviewed and is negative except as here & per HPI:      Past Medical History:  Past Medical History:   Diagnosis Date     A-fib (H) 2/14/2012     Biventricular ICD (implantable cardioverter-defibrillator) in place 3/30/2009     CHB (complete heart block) (H) 6/5/2012     Chronic respiratory failure (H) 6/1/2021     Colovesical fistula 3/26/2009     Congestive heart failure (H) 5/17/2007    Overview:  Systolic with EF 30-35% on ECHO 5/17/2007      COPD, group B, by GOLD 2017 classification (H) " 2/21/2013     Dizziness 8/12/2019     Episodic mood disorder (H) 5/22/2020     Left foot drop 6/12/2009     NICM (nonischemic cardiomyopathy) (H) 3/26/2009     Obstructive airway disease (H) 2/21/2013     JANETH (obstructive sleep apnea) 10/30/2021     Polycythemia 10/30/2021     Prostate cancer (H) 10/30/2021     Splenomegaly 10/30/2021     Ventricular bigeminy         Past Surgical History:  Past Surgical History:   Procedure Laterality Date     ABDOMINAL AORTIC ANEURYSM REPAIR       AS REPAIR 1 COLLAT ANKLE LIGMNT,PRIMARY       CHOLECYSTECTOMY       COLECTOMY WITH COLOSTOMY, COMBINED       EP ICD / PACEMAKER / LOOP RECORDER      Pacemaker placement     ESOPHAGOSCOPY, GASTROSCOPY, DUODENOSCOPY (EGD), COMBINED N/A 11/5/2021    Procedure: ESOPHAGOGASTRODUODENOSCOPY;  Surgeon: Leighton Tony DO;  Location: [facility] OR     HERNIA REPAIR       HRW VEIN STRIPPER       IMPLANTED DEVICE       MO ANESTH,REMOVAL OF ADRENAL       PROSTATECTOMY       REPAIR BLADDER       SIGMOIDOSCOPY FLEXIBLE N/A 11/5/2021    Procedure: SIGMOIDOSCOPY, FLEXIBLE;  Surgeon: Leighton Tony DO;  Location: [facility] OR     TAKEDOWN COLOSTOMY           Family History:  Family History   Family history unknown: Yes         Allergies:  Allergies   Allergen Reactions     Blood-Group Specific Substance Other (See Comments)     Patient has an anti-C.  Blood product orders may be delayed.  Please draw one red top tube and two lavender top tubes for all Type and Screens/ Type and Crossmatch orders.      Thiopental Nausea and Vomiting        Medications:  I have reviewed this patient's medication profile and medications from this hospitalization.            Physical Exam:     GENERAL:laying in bed,  opens eyes intermittently  SKIN: Warm and dry  HEENT: Normocephalic, anicteric sclera, moist mucous membranes  LUNGS: Clear to auscultation anterolaterally; non-labored, on O2 by 14 L face mask  CARDIAC: RRR, normal s1/s2, w/o m/r/g   ABDOMINAL: BS  "(+), soft, non distended, non tender  :decreased urine output,  Bladder scan minimal  MUSKL: no gross joint deformities   EXTREMITIES:2+  edema   pulses 2+ and symmetrical warm  NEUROLOGIC:Alert and oriented x 2  PSYCH: fatigued  .BP 99/59 (BP Location: Right arm)   Pulse 70   Temp 98  F (36.7  C) (Axillary)   Resp 26   Ht 1.88 m (6' 2\")   Wt 93.7 kg (206 lb 9.6 oz)   SpO2 96%   BMI 26.53 kg/m         Data Reviewed:      Recent imaging reviewed, my comments on pertinents:   11/8 CXR shows infiltrates     Echocardiogram 10/31/2021 (results reviewed):   The left ventricle is mildly dilated.  There is mild to moderate concentric left ventricular hypertrophy.  LV systolic function appears lower limits of normal  The visual ejection fraction is 50-55%.  Septal wall motion abnormality may reflect pacemaker activation.  TAPSE is normal, which is consistent with normal right ventricular systolic  function.  Pacemaker lead in the right heart  There is mild (1+) tricuspid regurgitation.  Estimate of RV systolic pressure is elevated at 54mmhg plus right atrial  pressure  Mild to moderate valvular aortic stenosis. Peak velocity 2.6 m/s,mean gradient  14mmhg. 2D visualization of the aortic valve suggests the potential for more  significant aortic stenosis then calculated by doppler examination  There is mild (1+) aortic regurgitation.  The aortic root is moderately dilated.Measures approxmately 4.6cm  Mild to moderate enlargement of the proximal ascending aorta measures  approximately 4.4cm    Recent lab data reviewed, my comments on pertinents:        Hgb 7.6, RBC's low   EF 50%  Alb 3.2  Cr 2.75  GFR 19       ====================================================  TT: I have personally spent a total of 35    minutes on the unit in review of medical record, consultation with the medical providers and assessment of patient today, with more than 50% of this time spent in counseling, coordination of care, and discussion " with dtr candace whitten: diagnostic results, prognosis, symptom management, risks and benefits of management options, and development of plan of care as noted above.      Total Visit Time 35 + 60   o F   o For Inpatient, 'Total Visit Time' = Unit Floor + Face-to-Face time.    Total Face-to-Face Prolonged Service Time: 60 min    Content of the Prolonged Time: family conference for comfort care    ====================================================    GREY Salazar, NP-C, ACHPN   [facility]  Palliative Medicine  Office: [phone]   PROVIDER:[TOKEN:[[id number]],FOLLOWUP:[Routine]]

## 2022-06-17 NOTE — ASU DISCHARGE PLAN (ADULT/PEDIATRIC) - ASU DC SPECIAL INSTRUCTIONSFT
Please take over the counter acetaminophen as needed for pain. You may take oxycodone if your pain is uncontrolled by acetaminophen. Keep the outer dressings on for two days. Leave the steri strips to fall off on their own. Please call to make an appointment with Dr. Horner in two weeks. Please take over the counter acetaminophen as needed for pain.   ******************************************************************************************   You may take oxycodone if your pain is uncontrolled by acetaminophen.   ******************************************************************************************   Keep the outer dressings on for two days. Leave the steri strips to fall off on their own.   ******************************************************************************************   Please call to make an appointment with Dr. Horner in two weeks.

## 2022-06-17 NOTE — PRE-ANESTHESIA EVALUATION ADULT - NSANTHSNORERD_ENT_A_CORE
No Show Applicator Variable?: Yes Render Post-Care Instructions In Note?: no Medical Necessity Information: It is in your best interest to select a reason for this procedure from the list below. All of these items fulfill various CMS LCD requirements except the new and changing color options. Detail Level: Detailed Consent: The patient's consent was obtained including but not limited to risks of crusting, scabbing, blistering, scarring, darker or lighter pigmentary change, recurrence, incomplete removal and infection. Post-Care Instructions: I reviewed with the patient in detail post-care instructions. Patient is to wear sunprotection, and avoid picking at any of the treated lesions. Pt may apply Vaseline to crusted or scabbing areas. Medical Necessity Clause: This procedure was medically necessary because the lesions that were treated were: Duration Of Freeze Thaw-Cycle (Seconds): 3 Number Of Freeze-Thaw Cycles: 1 freeze-thaw cycle

## 2022-06-17 NOTE — BRIEF OPERATIVE NOTE - OPERATION/FINDINGS
Transabdominal preperitoneal right inguinal hernia repair with progrip mesh. Entry into abdomen via Kerry port supraumbilically, two 5mm ports placed lateral to umbilicus. Peritoneal flap created above ASIS, indirect hernia identified. Cord structures  and hernia reduced. Progrip mesh placed, peritoneal flap closed with 2-0 v-loc. Fascia closed with 0-vicryl.

## 2022-06-17 NOTE — ASU PATIENT PROFILE, ADULT - NSICDXPASTMEDICALHX_GEN_ALL_CORE_FT
PAST MEDICAL HISTORY:  2019 novel coronavirus disease (COVID-19) 3/2022 weak and tired , no hospitalization    Anemia of chronic disease epogen 3/2022  ( history of blood transfusion 2015)    Back pain Chronic    BPH (benign prostatic hyperplasia) urinary retention , complicated UTI , MRSA bacteremia s/p suprapubic catheter    Chronic kidney disease (CKD) baseline creatinine 3.0-4.0, undergoing evaluation for kidney transplant @ [facility] and [place]    Epidural abscess 3/2015 MRSA  admitted & treated  Patient was managed with Epidural injection for pain Prior to MRSA    History of MRSA infection bacteremia 2015  Acute Renal failure at that time  [facility]    Hypertension     Spinal stenosis     UTI (urinary tract infection) With Colonization of Bacteria - asymptomatic

## 2022-07-08 ENCOUNTER — APPOINTMENT (OUTPATIENT)
Dept: PAIN MANAGEMENT | Facility: CLINIC | Age: 72
End: 2022-07-08

## 2022-07-08 PROCEDURE — 99212 OFFICE O/P EST SF 10 MIN: CPT | Mod: 95

## 2022-07-08 NOTE — REASON FOR VISIT
[Home] : at home, [unfilled] , at the time of the visit. [Medical Office: ([facility])___] : at the medical office located in  [Patient] : the patient

## 2022-07-08 NOTE — HISTORY OF PRESENT ILLNESS
[FreeTextEntry1] : 70 y/o M CKD stage 5 with Lumbar epidural abscess with hypotonic bladder and left foot drop with chronic lower back pain who is being seen for follow up. He had lap hernia repair July 17th, and recent stress test which was reported as normal.    Chronic lower back pain is constant occasionally radiating down LLE to buttock , 7-10/10 without meds but down to 1-2/10 with meds.  Pain well controlled with current meds. \par \par Denies AE, + constipation on occasion but normal BM \par \par Current meds: Oxycodone 7.5/325 mg 3x/day.

## 2022-07-08 NOTE — ASSESSMENT
[Opioids] : Patient was explained in detail about pain control by using opioids. Patient has signed and fully understands our guidelines for medication and drug screening.  Patient understands the side effects of opioids, including, but not limited to, drug tolerance, dependence, potential for addiction. This class of drugs is habit-forming and PAULINA regulated. The sedative effects of opioids can be potentiated by taking alcohol or any sleeping pills, along with opioids. The decision to drive is patient’s responsibility, as opioids can affect his/her driving ability and ability to concentrate. The long-term place is not clear, however, patient understands that once the pain control optimizes, the goal will be to wean off the opioids. All the issues regarding opioid treatment have been addressed satisfactorily.  [FreeTextEntry1] : 72 y/o M CKD stage 5 with suprapubic cath with Lumbar epidural abscess with hypotonic bladder and left foot drop with chronic lower back pain. ? candidate for renal transplant eval pending at [facility] \par \par Oxycodone 7.5/325 mg 3-4x/day qty 100 \par I-Stop reviewed,  reference #: [id number] \par Reluctant to Restart PT \par No signs of aberrant behavior and will continue to monitor for signs of toxicity.\par Reminded to continue to avoid alcohol.\par \par

## 2022-07-08 NOTE — REVIEW OF SYSTEMS
[Back Pain] : ~T back pain [As Noted in HPI] : as noted in HPI [Limb Weakness] : limb weakness [Negative] : Heme/Lymph [de-identified] : left foot drop

## 2022-08-12 PROBLEM — I10 ESSENTIAL (PRIMARY) HYPERTENSION: Chronic | Status: ACTIVE | Noted: 2022-06-10

## 2022-08-12 PROBLEM — Z86.14 PERSONAL HISTORY OF METHICILLIN RESISTANT STAPHYLOCOCCUS AUREUS INFECTION: Chronic | Status: ACTIVE | Noted: 2022-06-10

## 2022-08-12 PROBLEM — U07.1 COVID-19: Chronic | Status: ACTIVE | Noted: 2022-06-10

## 2022-08-12 PROBLEM — N18.9 CHRONIC KIDNEY DISEASE, UNSPECIFIED: Chronic | Status: ACTIVE | Noted: 2022-06-10

## 2022-08-12 PROBLEM — D63.8 ANEMIA IN OTHER CHRONIC DISEASES CLASSIFIED ELSEWHERE: Chronic | Status: ACTIVE | Noted: 2022-06-10

## 2022-08-31 ENCOUNTER — APPOINTMENT (OUTPATIENT)
Dept: PAIN MANAGEMENT | Facility: CLINIC | Age: 72
End: 2022-08-31

## 2022-08-31 PROCEDURE — 99212 OFFICE O/P EST SF 10 MIN: CPT | Mod: 95

## 2022-08-31 NOTE — REVIEW OF SYSTEMS
[Back Pain] : ~T back pain [As Noted in HPI] : as noted in HPI [Limb Weakness] : limb weakness [Negative] : Heme/Lymph [de-identified] : left foot drop

## 2022-08-31 NOTE — HISTORY OF PRESENT ILLNESS
[FreeTextEntry1] : 72 y/o M CKD stage 5 with Lumbar epidural abscess with hypotonic bladder and left foot drop with chronic lower back pain who is being seen for follow up.  Chronic lower back pain is constant occasionally radiating down LLE to buttock , 7-10/10 without meds but down to 1-2/10 with meds.  Pain well controlled with current meds. \par \par Denies AE, + constipation on occasion but normal BM \par \par Current meds: Oxycodone 7.5/325 mg 3x/day.

## 2022-08-31 NOTE — REASON FOR VISIT
[Follow-Up: _____] : a [unfilled] follow-up visit [Medical Office: ([facility])___] : at the medical office located in  [Patient] : the patient [Other Location: e.g. School (Enter Location, City,State)___] : at [unfilled], at the time of the visit.

## 2022-08-31 NOTE — ASSESSMENT
[Opioids] : Patient was explained in detail about pain control by using opioids. Patient has signed and fully understands our guidelines for medication and drug screening.  Patient understands the side effects of opioids, including, but not limited to, drug tolerance, dependence, potential for addiction. This class of drugs is habit-forming and [place]UL[place] regulated. The sedative effects of opioids can be potentiated by taking alcohol or any sleeping pills, along with opioids. The decision to drive is patient’s responsibility, as opioids can affect his/her driving ability and ability to concentrate. The long-term place is not clear, however, patient understands that once the pain control optimizes, the goal will be to wean off the opioids. All the issues regarding opioid treatment have been addressed satisfactorily.  [FreeTextEntry1] : 70 y/o M CKD stage 5 with suprapubic cath with Lumbar epidural abscess with hypotonic bladder and left foot drop with chronic lower back pain. ? candidate for renal transplant eval pending at [facility]. \par \par Discussed possible upcoming changes to pain practice and will be provided list of Pain providers. \par \par continue Oxycodone 7.5/325 mg 3-4x/day qty 100 \par I-Stop reviewed,  reference #: [id number]\par Reluctant to Restart PT \par No signs of aberrant behavior and will continue to monitor for signs of toxicity.\par Reminded to continue to avoid alcohol.\par \par

## 2022-10-14 ENCOUNTER — APPOINTMENT (OUTPATIENT)
Dept: PAIN MANAGEMENT | Facility: CLINIC | Age: 72
End: 2022-10-14

## 2022-10-14 PROCEDURE — 99212 OFFICE O/P EST SF 10 MIN: CPT | Mod: 95

## 2022-10-14 NOTE — HISTORY OF PRESENT ILLNESS
[FreeTextEntry1] : 72 y/o M HTN, HLD, CKD stage 5 with Lumbar epidural abscess with hypotonic bladder and left foot drop with chronic lower back pain who is being seen for follow up.  Chronic lower back pain is constant occasionally radiating down LLE to buttock 7-10/10 without meds but down to 2/10 with meds.  Pain well controlled with current meds. \par \par Denies AE, + constipation on occasion but normal BM \par \par Prior meds: Cymbalta, Soma \par Current meds: Oxycodone 7.5/325 mg 3x/day.

## 2022-10-14 NOTE — REASON FOR VISIT
[Follow-Up: _____] : a [unfilled] follow-up visit [Other Location: e.g. School (Enter Location, City,State)___] : at [unfilled], at the time of the visit. [Medical Office: ([facility])___] : at the medical office located in  [Patient] : the patient

## 2022-10-14 NOTE — REVIEW OF SYSTEMS
[Back Pain] : ~T back pain [As Noted in HPI] : as noted in HPI [Limb Weakness] : limb weakness [Negative] : Heme/Lymph [de-identified] : left foot drop

## 2022-10-14 NOTE — ASSESSMENT
[Opioids] : Patient was explained in detail about pain control by using opioids. Patient has signed and fully understands our guidelines for medication and drug screening.  Patient understands the side effects of opioids, including, but not limited to, drug tolerance, dependence, potential for addiction. This class of drugs is habit-forming and [place]UL[place] regulated. The sedative effects of opioids can be potentiated by taking alcohol or any sleeping pills, along with opioids. The decision to drive is patient’s responsibility, as opioids can affect his/her driving ability and ability to concentrate. The long-term place is not clear, however, patient understands that once the pain control optimizes, the goal will be to wean off the opioids. All the issues regarding opioid treatment have been addressed satisfactorily.  [FreeTextEntry1] : 72 y/o M CKD stage 5 with suprapubic cath with Lumbar epidural abscess with hypotonic bladder and left foot drop with chronic lower back pain. He is candidate for renal transplant at [facility] and waiting to get on the transplant list which could take up to 4-8 years. \par \par Discussed possible upcoming changes to pain practice and will be provided list of Pain providers. \par \par continue Oxycodone 7.5/325 mg 3-4x/day qty 100 \par I-Stop reviewed,  reference #: [id number]\par Reluctant to Restart PT \par No signs of aberrant behavior and will continue to monitor for signs of toxicity.\par Reminded to continue to avoid alcohol.\par \par

## 2022-10-31 ENCOUNTER — NON-APPOINTMENT (OUTPATIENT)
Age: 72
End: 2022-10-31

## 2022-11-08 ENCOUNTER — RX ONLY (RX ONLY)
Age: 72
End: 2022-11-08

## 2022-11-08 ENCOUNTER — OFFICE (OUTPATIENT)
Dept: URBAN - METROPOLITAN AREA CLINIC 27 | Facility: CLINIC | Age: 72
Setting detail: OPHTHALMOLOGY
End: 2022-11-08
Payer: COMMERCIAL

## 2022-11-08 DIAGNOSIS — H01.131: ICD-10-CM

## 2022-11-08 DIAGNOSIS — H16.423: ICD-10-CM

## 2022-11-08 DIAGNOSIS — H01.001: ICD-10-CM

## 2022-11-08 DIAGNOSIS — H02.89: ICD-10-CM

## 2022-11-08 PROBLEM — H01.004 BLEPHARITIS; RIGHT UPPER LID, RIGHT LOWER LID, LEFT UPPER LID, LEFT LOWER LID: Status: ACTIVE | Noted: 2022-11-08

## 2022-11-08 PROBLEM — H01.002 BLEPHARITIS; RIGHT UPPER LID, RIGHT LOWER LID, LEFT UPPER LID, LEFT LOWER LID: Status: ACTIVE | Noted: 2022-11-08

## 2022-11-08 PROBLEM — H01.005 BLEPHARITIS; RIGHT UPPER LID, RIGHT LOWER LID, LEFT UPPER LID, LEFT LOWER LID: Status: ACTIVE | Noted: 2022-11-08

## 2022-11-08 PROBLEM — H01.134 ECZEMATOUS DERMATATIS; RIGHT UPPER LID, LEFT UPPER LID: Status: ACTIVE | Noted: 2022-11-08

## 2022-11-08 PROCEDURE — 92002 INTRM OPH EXAM NEW PATIENT: CPT | Performed by: OPHTHALMOLOGY

## 2022-11-08 ASSESSMENT — VISUAL ACUITY
OD_BCVA: 20/20-2
OS_BCVA: 20/20-1

## 2022-11-08 ASSESSMENT — SPHEQUIV_DERIVED
OD_SPHEQUIV: -3.5
OS_SPHEQUIV: -4.375

## 2022-11-08 ASSESSMENT — REFRACTION_AUTOREFRACTION
OD_AXIS: 157
OS_SPHERE: -4.75
OS_AXIS: 024
OS_CYLINDER: +0.75
OD_SPHERE: -4.00
OD_CYLINDER: +1.00

## 2022-11-08 ASSESSMENT — TONOMETRY
OS_IOP_MMHG: 16
OD_IOP_MMHG: 18
OS_IOP_MMHG: 16
OD_IOP_MMHG: 15

## 2022-11-08 ASSESSMENT — KERATOMETRY
OS_AXISANGLE_DEGREES: 056
OD_AXISANGLE_DEGREES: 140
OD_K1POWER_DIOPTERS: 44.00
OD_K2POWER_DIOPTERS: 44.25
OS_K1POWER_DIOPTERS: 44.00
OS_K2POWER_DIOPTERS: 44.50

## 2022-11-08 ASSESSMENT — AXIALLENGTH_DERIVED
OS_AL: 25.1131
OD_AL: 24.7832

## 2022-11-08 ASSESSMENT — LID EXAM ASSESSMENTS
OD_MEIBOMITIS: 2+
OS_MEIBOMITIS: 2+
OS_BLEPHARITIS: LLL LUL 2+
OD_BLEPHARITIS: RLL RUL 2+

## 2022-11-08 ASSESSMENT — VASCULARIZATION
OD_VASCULARIZATION: PANNUS PERIPHERAL SUPERFICIAL
OS_VASCULARIZATION: PANNUS PERIPHERAL SUPERFICIAL

## 2022-11-08 ASSESSMENT — CONFRONTATIONAL VISUAL FIELD TEST (CVF)
OD_FINDINGS: FULL
OS_FINDINGS: FULL

## 2022-11-30 ENCOUNTER — APPOINTMENT (OUTPATIENT)
Dept: PAIN MANAGEMENT | Facility: CLINIC | Age: 72
End: 2022-11-30

## 2022-11-30 PROCEDURE — 99212 OFFICE O/P EST SF 10 MIN: CPT | Mod: 95

## 2022-11-30 NOTE — REASON FOR VISIT
[Follow-Up: _____] : a [unfilled] follow-up visit [Other Location: e.g. School (Enter Location, City,State)___] : at [unfilled], at the time of the visit. [Medical Office: ([facility])___] : at the medical office located in  [Patient] : the patient

## 2022-11-30 NOTE — REVIEW OF SYSTEMS
[Back Pain] : ~T back pain [As Noted in HPI] : as noted in HPI [Limb Weakness] : limb weakness [Negative] : Heme/Lymph [de-identified] : left foot drop

## 2022-11-30 NOTE — ASSESSMENT
[Opioids] : Patient was explained in detail about pain control by using opioids. Patient has signed and fully understands our guidelines for medication and drug screening.  Patient understands the side effects of opioids, including, but not limited to, drug tolerance, dependence, potential for addiction. This class of drugs is habit-forming and [place]UL[place] regulated. The sedative effects of opioids can be potentiated by taking alcohol or any sleeping pills, along with opioids. The decision to drive is patient’s responsibility, as opioids can affect his/her driving ability and ability to concentrate. The long-term place is not clear, however, patient understands that once the pain control optimizes, the goal will be to wean off the opioids. All the issues regarding opioid treatment have been addressed satisfactorily.  [FreeTextEntry1] : 71 y/o M CKD stage 5 with suprapubic cath with Lumbar epidural abscess with hypotonic bladder and left foot drop with chronic lower back pain. He is candidate for renal transplant at [facility] and waiting to get on the transplant list which could take up to 4-8 years. \par \par Discussed possible upcoming changes to pain practice and will be provided list of Pain providers. \par \par continue Oxycodone 7.5/325 mg 3-4x/day qty 100 , consider decreasing \par I-Stop reviewed,  reference #: [id number]\par Reluctant to Restart PT \par No signs of aberrant behavior and will continue to monitor for signs of toxicity.\par Reminded to continue to avoid alcohol.\par \par

## 2022-11-30 NOTE — HISTORY OF PRESENT ILLNESS
[FreeTextEntry1] : 71 y/o M HTN, HLD, CKD stage 5 with Lumbar epidural abscess with hypotonic bladder and left foot drop with chronic lower back pain who is being seen for follow up. Patient continues to report hronic lower back pain is constant occasionally radiating down LLE to buttock down to 2/10 with meds.  Pain well controlled with current meds. \par \par Denies AE, + constipation on occasion but normal BM \par \par Prior meds: Cymbalta, Soma \par Current meds: Oxycodone 7.5/325 mg 3x/day.

## 2023-01-01 NOTE — ED ADULT NURSE NOTE - PMH
Anemia  Transfused 3/2015  Back pain    BPH (benign prostatic hyperplasia)    Epidural abscess  3/2015 MRSA  admitted & treated  Patient was managed with Epidural injection for pain Prior to MRSA  Other hydronephrosis  Renal insuffiencey  Spinal stenosis    UTI (urinary tract infection)  With Colonization of Bacteria

## 2023-01-10 ENCOUNTER — APPOINTMENT (OUTPATIENT)
Dept: CARDIOLOGY | Facility: CLINIC | Age: 73
End: 2023-01-10
Payer: MEDICARE

## 2023-01-10 VITALS — SYSTOLIC BLOOD PRESSURE: 140 MMHG | DIASTOLIC BLOOD PRESSURE: 70 MMHG

## 2023-01-10 VITALS
RESPIRATION RATE: 17 BRPM | SYSTOLIC BLOOD PRESSURE: 152 MMHG | HEIGHT: 68 IN | BODY MASS INDEX: 20.92 KG/M2 | HEART RATE: 56 BPM | OXYGEN SATURATION: 99 % | WEIGHT: 138 LBS | DIASTOLIC BLOOD PRESSURE: 76 MMHG

## 2023-01-10 DIAGNOSIS — E78.00 PURE HYPERCHOLESTEROLEMIA, UNSPECIFIED: ICD-10-CM

## 2023-01-10 DIAGNOSIS — Z01.810 ENCOUNTER FOR PREPROCEDURAL CARDIOVASCULAR EXAMINATION: ICD-10-CM

## 2023-01-10 DIAGNOSIS — I10 ESSENTIAL (PRIMARY) HYPERTENSION: ICD-10-CM

## 2023-01-10 PROCEDURE — 99214 OFFICE O/P EST MOD 30 MIN: CPT

## 2023-01-10 NOTE — HISTORY OF PRESENT ILLNESS
[FreeTextEntry1] : 72-year-old male with a history of hypertension, hypercholesterolemia, and CKD who is awaiting a position on renal transplant.  He was seen about 10 months ago and had an echo and stress test performed as part of his evaluation.  These were unremarkable.  He remains asymptomatic and feeling generally well.  He needs a chest x-ray performed for his transplant application.\par \par His blood pressure and cholesterol remain well controlled.  He had an LDL of 80 on his lab data.

## 2023-01-10 NOTE — DISCUSSION/SUMMARY
[FreeTextEntry1] : Mr Hopkins has been asymptomatic and feels well since his last visit 10 months ago.  His exam shows normal blood pressure, clear lungs, and normal cardiac exam, and no peripheral edema.  An EKG on his smart phone is unremarkable.\par \par A chest x-ray was ordered.\par \par He asked about the calcification of his aortic valve and I explained the significance and the possibility of aortic stenosis.\par \par He will continue on his current regimen of Lipitor 10 and lisinopril 10.  He will follow-up on a as needed basis.

## 2023-01-12 ENCOUNTER — APPOINTMENT (OUTPATIENT)
Dept: PAIN MANAGEMENT | Facility: CLINIC | Age: 73
End: 2023-01-12
Payer: MEDICARE

## 2023-01-12 PROCEDURE — 99212 OFFICE O/P EST SF 10 MIN: CPT | Mod: 95

## 2023-01-12 NOTE — HISTORY OF PRESENT ILLNESS
[FreeTextEntry1] : 71 y/o M HTN, HLD, CKD stage 5 with Lumbar epidural abscess with hypotonic bladder and left foot drop with chronic lower back pain who is being seen for follow up. Patient continues to report chronic constant lower back pain occasionally radiating down LLE to buttock down to 2/10 with meds.  Pain well controlled with current meds. \par \par Denies AE, + constipation on occasion but normal BM \par \par Prior meds: Cymbalta, Soma \par Current meds: Oxycodone 7.5/325 mg 3x/day.

## 2023-01-12 NOTE — REVIEW OF SYSTEMS
[Back Pain] : ~T back pain [As Noted in HPI] : as noted in HPI [Limb Weakness] : limb weakness [Negative] : Heme/Lymph [de-identified] : left foot drop

## 2023-01-12 NOTE — REASON FOR VISIT
[Follow-Up: _____] : a [unfilled] follow-up visit [Patient] : the patient [Self] : self [Other Location: e.g. School (Enter Location, City,State)___] : at [unfilled], at the time of the visit. [Medical Office: ([facility])___] : at the medical office located in

## 2023-01-12 NOTE — ASSESSMENT
[Opioids] : Patient was explained in detail about pain control by using opioids. Patient has signed and fully understands our guidelines for medication and drug screening.  Patient understands the side effects of opioids, including, but not limited to, drug tolerance, dependence, potential for addiction. This class of drugs is habit-forming and [place]UL[place] regulated. The sedative effects of opioids can be potentiated by taking alcohol or any sleeping pills, along with opioids. The decision to drive is patient’s responsibility, as opioids can affect his/her driving ability and ability to concentrate. The long-term place is not clear, however, patient understands that once the pain control optimizes, the goal will be to wean off the opioids. All the issues regarding opioid treatment have been addressed satisfactorily.  [FreeTextEntry1] : 71 y/o M CKD stage 5 with suprapubic cath with Lumbar epidural abscess with hypotonic bladder and left foot drop with chronic lower back pain. He is candidate for renal transplant at [facility] and waiting to get on the transplant list which could take up to 4-8 years. \par \par Discussed possible upcoming changes to pain practice and will be provided list of Pain providers. \par \par continue Oxycodone 7.5/325 mg 3-4x/day qty 100 , consider decreasing \par I-Stop reviewed,  reference #: [id number]\par Reluctant to Restart PT \par No signs of aberrant behavior and will continue to monitor for signs of toxicity.\par Reminded to continue to avoid alcohol.\par \par

## 2023-01-17 ENCOUNTER — APPOINTMENT (OUTPATIENT)
Dept: RADIOLOGY | Facility: CLINIC | Age: 73
End: 2023-01-17
Payer: MEDICARE

## 2023-01-17 PROCEDURE — 71046 X-RAY EXAM CHEST 2 VIEWS: CPT

## 2023-02-24 ENCOUNTER — NON-APPOINTMENT (OUTPATIENT)
Age: 73
End: 2023-02-24

## 2023-02-24 ENCOUNTER — APPOINTMENT (OUTPATIENT)
Dept: PAIN MANAGEMENT | Facility: CLINIC | Age: 73
End: 2023-02-24
Payer: MEDICARE

## 2023-02-24 VITALS
BODY MASS INDEX: 20.46 KG/M2 | HEIGHT: 68 IN | HEART RATE: 71 BPM | WEIGHT: 135 LBS | SYSTOLIC BLOOD PRESSURE: 139 MMHG | DIASTOLIC BLOOD PRESSURE: 77 MMHG

## 2023-02-24 PROCEDURE — 99214 OFFICE O/P EST MOD 30 MIN: CPT

## 2023-02-24 NOTE — HISTORY OF PRESENT ILLNESS
[FreeTextEntry1] : 71 y/o M HTN, HLD, CKD stage 5 with Lumbar epidural abscess with hypotonic bladder and left foot drop with chronic lower back pain who is being seen for follow up. Reports no new medical issues. On Renal transplant list \par at [facility] and can take between 1-2 years.  Pain fairly well controlled with current meds. Continues to report chronic constant lower back pain occasionally radiating down LLE to buttock down to 2/10 with meds.  \par \par Denies AE, + constipation on occasion but normal BM \par \par Prior meds: Cymbalta, Soma \par Current meds: Oxycodone 7.5/325 mg 3x/day.

## 2023-06-15 NOTE — ED ADULT TRIAGE NOTE - NS ED NOTE AC HIGH RISK COUNTRIES
No I have personally seen and examined this patient. I fully participated in the care of this patient. I have made amendments to the documentation where appropriate and otherwise agree with the history, physical exam, and plan as documented by the

## 2023-06-22 ENCOUNTER — APPOINTMENT (OUTPATIENT)
Dept: PAIN MANAGEMENT | Facility: CLINIC | Age: 73
End: 2023-06-22
Payer: MEDICARE

## 2023-06-22 ENCOUNTER — NON-APPOINTMENT (OUTPATIENT)
Age: 73
End: 2023-06-22

## 2023-06-22 PROCEDURE — 99212 OFFICE O/P EST SF 10 MIN: CPT | Mod: 95

## 2023-06-22 NOTE — REVIEW OF SYSTEMS
[de-identified] : left foot drop  [Back Pain] : ~T back pain [As Noted in HPI] : as noted in HPI [Limb Weakness] : limb weakness [Negative] : Heme/Lymph

## 2023-06-22 NOTE — PHYSICAL EXAM
[FreeTextEntry1] : no signs of toxicity  [General Appearance - Alert] : alert [General Appearance - Well Nourished] : well nourished [General Appearance - Well Developed] : well developed [Oriented To Time, Place, And Person] : oriented to person, place, and time [Affect] : the affect was normal [Mood] : the mood was normal

## 2023-06-22 NOTE — ASSESSMENT
[FreeTextEntry1] : 73 y/o M CKD stage 5 with suprapubic cath with Lumbar epidural abscess with hypotonic bladder and left foot drop with chronic lower back pain. He is candidate for renal transplant at [facility] and waiting to get on the transplant list which could take up to 4-8 years. \par \par Discussed possible upcoming changes to pain practice and will be provided list of Pain providers. \par \par continue Oxycodone 7.5/325 mg 3-4x/day qty 100 , consider decreasing \par I-Stop reviewed,  reference #: [id number]\par Reluctant to Restart PT \par No signs of aberrant behavior and will continue to monitor for signs of toxicity.\par Reminded to continue to avoid alcohol.\par \par

## 2023-06-22 NOTE — ASSESSMENT
[FreeTextEntry1] : 73 y/o M CKD stage 5 with suprapubic cath with Lumbar epidural abscess with hypotonic bladder and left foot drop with chronic lower back pain. On  renal transplant at [facility] \par \par continue Oxycodone 7.5/325 mg 3-4x/day qty 100 , med being refilled q 25 day and taking closer to 4x/day dosing. Will increase to 105 tabs but will be for 30 days. \par Reluctant to Restart PT \par UDS performed today Feb 24, 2023 \par I-Stop reviewed,  reference #: [id number]\par No signs of aberrant behavior and will continue to monitor for signs of toxicity.\par Reminded to continue to avoid alcohol.\par Patient denies other prescribers. \par Discussed OD prevention education and prescribed naloxone kit \par Safe storage of medication was reviewed. \par Opiate agreement was renewed Feb 24, 2023 \par \par \par \par  [Opioids] : Patient was explained in detail about pain control by using opioids. Patient has signed and fully understands our guidelines for medication and drug screening.  Patient understands the side effects of opioids, including, but not limited to, drug tolerance, dependence, potential for addiction. This class of drugs is habit-forming and PAULINA regulated. The sedative effects of opioids can be potentiated by taking alcohol or any sleeping pills, along with opioids. The decision to drive is patient’s responsibility, as opioids can affect his/her driving ability and ability to concentrate. The long-term place is not clear, however, patient understands that once the pain control optimizes, the goal will be to wean off the opioids. All the issues regarding opioid treatment have been addressed satisfactorily.

## 2023-06-22 NOTE — HISTORY OF PRESENT ILLNESS
[FreeTextEntry1] : 73 y/o M HTN, HLD, CKD stage 5 with Lumbar epidural abscess with hypotonic bladder and left foot drop with chronic lower back pain who is being seen for follow up. Patient continues to report chronic constant lower back pain occasionally radiating down LLE to buttock down to 2-3/10 with meds.  Pain well controlled with current meds. \par \par Denies AE, + constipation on occasion but normal BM \par \par Prior meds: Cymbalta, Soma, Gabapentin, Lyrica \par Current meds: Oxycodone 7.5/325 mg 3x/day.

## 2023-08-03 ENCOUNTER — NON-APPOINTMENT (OUTPATIENT)
Age: 73
End: 2023-08-03

## 2023-09-06 ENCOUNTER — APPOINTMENT (OUTPATIENT)
Dept: PAIN MANAGEMENT | Facility: CLINIC | Age: 73
End: 2023-09-06
Payer: MEDICARE

## 2023-09-06 ENCOUNTER — NON-APPOINTMENT (OUTPATIENT)
Age: 73
End: 2023-09-06

## 2023-09-06 VITALS
SYSTOLIC BLOOD PRESSURE: 136 MMHG | HEIGHT: 68 IN | DIASTOLIC BLOOD PRESSURE: 75 MMHG | WEIGHT: 125 LBS | BODY MASS INDEX: 18.94 KG/M2 | HEART RATE: 59 BPM

## 2023-09-06 PROCEDURE — 99213 OFFICE O/P EST LOW 20 MIN: CPT

## 2023-09-06 NOTE — ASSESSMENT
[FreeTextEntry1] : 73 y/o M CKD stage 5 with suprapubic cath with Lumbar epidural abscess with hypotonic bladder and left foot drop with chronic lower back pain. He is candidate for renal transplant at [facility] and waiting to get on the transplant list which could take up to 4-8 years.   continue Oxycodone 7.5/325 mg 3-4x/day qty 100 ,  UDS performed Feb 2023 c/w prescribed. Repeated Sep 06, 2023  I-Stop reviewed,  reference #: [id number] No signs of aberrant behavior and will continue to monitor for signs of toxicity. Reminded to continue to avoid alcohol. Patient denies other prescribers.  Discussed OD prevention education and prescribed naloxone kit  Safe storage of medication was reviewed.  Opiate agreement was renewed Sep 06, 2023

## 2023-09-06 NOTE — HISTORY OF PRESENT ILLNESS
[FreeTextEntry1] : 73 y/o M HTN, HLD, CKD stage 5 with Lumbar epidural abscess with hypotonic bladder and left foot drop with chronic lower back pain who is being seen for follow up.   Reports no new medical issues.  Overall no significant change in pain levels. He continues to c/p chronic constant lower back pain occasionally radiating down LLE to buttock down to 2-3/10 with meds.  Pain well controlled with current meds. Yesterday he had increased pain and required 4th tab yesterday. + constipation required Dulcolax, increased water and had BM today.    Denies AE, + constipation on occasion but normal BM   Prior meds: Cymbalta, Soma, Gabapentin, Lyrica  Current meds: Oxycodone 7.5/325 mg 3-4x/day.

## 2023-11-06 ENCOUNTER — OUTPATIENT (OUTPATIENT)
Dept: OUTPATIENT SERVICES | Facility: HOSPITAL | Age: 73
LOS: 1 days | End: 2023-11-06
Payer: MEDICARE

## 2023-11-06 VITALS
OXYGEN SATURATION: 100 % | HEART RATE: 63 BPM | DIASTOLIC BLOOD PRESSURE: 82 MMHG | TEMPERATURE: 97 F | HEIGHT: 68 IN | WEIGHT: 134.48 LBS | RESPIRATION RATE: 16 BRPM | SYSTOLIC BLOOD PRESSURE: 140 MMHG

## 2023-11-06 DIAGNOSIS — Z98.890 OTHER SPECIFIED POSTPROCEDURAL STATES: Chronic | ICD-10-CM

## 2023-11-06 DIAGNOSIS — Z01.818 ENCOUNTER FOR OTHER PREPROCEDURAL EXAMINATION: ICD-10-CM

## 2023-11-06 DIAGNOSIS — N18.9 CHRONIC KIDNEY DISEASE, UNSPECIFIED: ICD-10-CM

## 2023-11-06 DIAGNOSIS — G06.2 EXTRADURAL AND SUBDURAL ABSCESS, UNSPECIFIED: Chronic | ICD-10-CM

## 2023-11-06 DIAGNOSIS — K40.90 UNILATERAL INGUINAL HERNIA, WITHOUT OBSTRUCTION OR GANGRENE, NOT SPECIFIED AS RECURRENT: ICD-10-CM

## 2023-11-06 LAB
BLD GP AB SCN SERPL QL: SIGNIFICANT CHANGE UP
BLD GP AB SCN SERPL QL: SIGNIFICANT CHANGE UP

## 2023-11-06 PROCEDURE — 86900 BLOOD TYPING SEROLOGIC ABO: CPT

## 2023-11-06 PROCEDURE — 36415 COLL VENOUS BLD VENIPUNCTURE: CPT

## 2023-11-06 PROCEDURE — 93005 ELECTROCARDIOGRAM TRACING: CPT

## 2023-11-06 PROCEDURE — 86901 BLOOD TYPING SEROLOGIC RH(D): CPT

## 2023-11-06 PROCEDURE — 86850 RBC ANTIBODY SCREEN: CPT

## 2023-11-06 PROCEDURE — 93010 ELECTROCARDIOGRAM REPORT: CPT | Mod: NC

## 2023-11-06 PROCEDURE — G0463: CPT

## 2023-11-06 RX ORDER — SODIUM CHLORIDE 9 MG/ML
1000 INJECTION INTRAMUSCULAR; INTRAVENOUS; SUBCUTANEOUS
Refills: 0 | Status: DISCONTINUED | OUTPATIENT
Start: 2023-11-16 | End: 2023-11-16

## 2023-11-06 NOTE — H&P PST ADULT - PROBLEM SELECTOR PLAN 1
Patient provided with pre-operative instructions and verbalized understanding.  Patient  will be NPO on day of surgery. Patient will stop NSAIDs, aspirin, herbal supplements or vitamins 1 week prior to surgery.  Chlorhexidine wash provided with instructions.

## 2023-11-06 NOTE — H&P PST ADULT - OTHER CARE PROVIDERS
nephrologist Dr. Simon Sow [phone],  cardiologist Dr. Marcos Frank ( [facility]) urologist Dr. Martínez [phone]

## 2023-11-06 NOTE — H&P PST ADULT - HISTORY OF PRESENT ILLNESS
This is a 72 yr old M with history of HLD, HTN, CKD 5 (awaiting kidney tranplant), who presents to PST with pre-operative diagnosis of left inguinal hernia. Pt reports left groin bulge x 5 months.  Denies any worsening of symptoms, or change in bowel habits.  Otherwise patient feels well today and denies any acute symptoms.

## 2023-11-06 NOTE — H&P PST ADULT - NSICDXPASTMEDICALHX_GEN_ALL_CORE_FT
PAST MEDICAL HISTORY:  2019 novel coronavirus disease (COVID-19) 3/2022 weak and tired , no hospitalization    Anemia of chronic disease epogen 3/2022  ( history of blood transfusion 2015)    Back pain Chronic    BPH (benign prostatic hyperplasia) urinary retention , complicated UTI , MRSA bacteremia s/p suprapubic catheter    Chronic kidney disease (CKD) baseline creatinine 3.0-4.0, undergoing evaluation for kidney transplant @ [facility] and [place]    Epidural abscess 3/2015 MRSA  admitted & treated  Patient was managed with Epidural injection for pain Prior to MRSA    History of MRSA infection bacteremia 2015  Acute Renal failure at that time  [facility]    Hypertension     Spinal stenosis     UTI (urinary tract infection) With Colonization of Bacteria - asymptomatic

## 2023-11-15 ENCOUNTER — TRANSCRIPTION ENCOUNTER (OUTPATIENT)
Age: 73
End: 2023-11-15

## 2023-11-16 ENCOUNTER — OUTPATIENT (OUTPATIENT)
Dept: OUTPATIENT SERVICES | Facility: HOSPITAL | Age: 73
LOS: 1 days | End: 2023-11-16
Payer: MEDICARE

## 2023-11-16 ENCOUNTER — TRANSCRIPTION ENCOUNTER (OUTPATIENT)
Age: 73
End: 2023-11-16

## 2023-11-16 VITALS
DIASTOLIC BLOOD PRESSURE: 69 MMHG | SYSTOLIC BLOOD PRESSURE: 142 MMHG | OXYGEN SATURATION: 97 % | TEMPERATURE: 98 F | RESPIRATION RATE: 16 BRPM | HEART RATE: 60 BPM

## 2023-11-16 VITALS
DIASTOLIC BLOOD PRESSURE: 74 MMHG | WEIGHT: 134.48 LBS | TEMPERATURE: 98 F | RESPIRATION RATE: 14 BRPM | HEIGHT: 68 IN | HEART RATE: 54 BPM | SYSTOLIC BLOOD PRESSURE: 126 MMHG | OXYGEN SATURATION: 99 %

## 2023-11-16 DIAGNOSIS — Z98.890 OTHER SPECIFIED POSTPROCEDURAL STATES: Chronic | ICD-10-CM

## 2023-11-16 DIAGNOSIS — K40.90 UNILATERAL INGUINAL HERNIA, WITHOUT OBSTRUCTION OR GANGRENE, NOT SPECIFIED AS RECURRENT: ICD-10-CM

## 2023-11-16 DIAGNOSIS — G06.2 EXTRADURAL AND SUBDURAL ABSCESS, UNSPECIFIED: Chronic | ICD-10-CM

## 2023-11-16 LAB
ABO RH CONFIRMATION: SIGNIFICANT CHANGE UP
ABO RH CONFIRMATION: SIGNIFICANT CHANGE UP

## 2023-11-16 PROCEDURE — S2900: CPT

## 2023-11-16 PROCEDURE — C9399: CPT

## 2023-11-16 PROCEDURE — C1781: CPT

## 2023-11-16 PROCEDURE — 49650 LAP ING HERNIA REPAIR INIT: CPT | Mod: LT

## 2023-11-16 PROCEDURE — 36415 COLL VENOUS BLD VENIPUNCTURE: CPT

## 2023-11-16 PROCEDURE — 49650 LAP ING HERNIA REPAIR INIT: CPT | Mod: AS,LT

## 2023-11-16 DEVICE — MESH HERNIA INGUINAL PROGRIP LAPAROSCOPIC 15 X 10CM LEFT: Type: IMPLANTABLE DEVICE | Site: LEFT | Status: FUNCTIONAL

## 2023-11-16 RX ORDER — TRAMADOL HYDROCHLORIDE 50 MG/1
1 TABLET ORAL
Qty: 12 | Refills: 0
Start: 2023-11-16

## 2023-11-16 RX ORDER — FERROUS SULFATE 325(65) MG
2 TABLET ORAL
Qty: 0 | Refills: 0 | DISCHARGE

## 2023-11-16 RX ORDER — HYDROMORPHONE HYDROCHLORIDE 2 MG/ML
0.5 INJECTION INTRAMUSCULAR; INTRAVENOUS; SUBCUTANEOUS
Refills: 0 | Status: DISCONTINUED | OUTPATIENT
Start: 2023-11-16 | End: 2023-11-16

## 2023-11-16 RX ORDER — SODIUM BICARBONATE 1 MEQ/ML
1 SYRINGE (ML) INTRAVENOUS
Qty: 0 | Refills: 0 | DISCHARGE

## 2023-11-16 RX ORDER — LISINOPRIL 2.5 MG/1
1 TABLET ORAL
Qty: 0 | Refills: 0 | DISCHARGE

## 2023-11-16 RX ORDER — OXYCODONE HYDROCHLORIDE 5 MG/1
1 TABLET ORAL
Qty: 0 | Refills: 0 | DISCHARGE

## 2023-11-16 RX ORDER — OXYCODONE HYDROCHLORIDE 5 MG/1
5 TABLET ORAL ONCE
Refills: 0 | Status: DISCONTINUED | OUTPATIENT
Start: 2023-11-16 | End: 2023-11-16

## 2023-11-16 RX ORDER — ATORVASTATIN CALCIUM 80 MG/1
1 TABLET, FILM COATED ORAL
Qty: 0 | Refills: 0 | DISCHARGE

## 2023-11-16 RX ORDER — SODIUM CHLORIDE 9 MG/ML
1000 INJECTION, SOLUTION INTRAVENOUS
Refills: 0 | Status: DISCONTINUED | OUTPATIENT
Start: 2023-11-16 | End: 2023-11-16

## 2023-11-16 RX ORDER — HYDROMORPHONE HYDROCHLORIDE 2 MG/ML
1 INJECTION INTRAMUSCULAR; INTRAVENOUS; SUBCUTANEOUS
Refills: 0 | Status: DISCONTINUED | OUTPATIENT
Start: 2023-11-16 | End: 2023-11-16

## 2023-11-16 RX ORDER — DAPAGLIFLOZIN 10 MG/1
1 TABLET, FILM COATED ORAL
Qty: 0 | Refills: 0 | DISCHARGE

## 2023-11-16 RX ORDER — AMLODIPINE BESYLATE 2.5 MG/1
1 TABLET ORAL
Qty: 0 | Refills: 0 | DISCHARGE

## 2023-11-16 RX ORDER — ONDANSETRON 8 MG/1
4 TABLET, FILM COATED ORAL ONCE
Refills: 0 | Status: DISCONTINUED | OUTPATIENT
Start: 2023-11-16 | End: 2023-11-16

## 2023-11-16 RX ADMIN — SODIUM CHLORIDE 25 MILLILITER(S): 9 INJECTION INTRAMUSCULAR; INTRAVENOUS; SUBCUTANEOUS at 11:40

## 2023-11-16 RX ADMIN — SODIUM CHLORIDE 75 MILLILITER(S): 9 INJECTION, SOLUTION INTRAVENOUS at 15:33

## 2023-11-16 NOTE — ASU PATIENT PROFILE, ADULT - VISION (WITH CORRECTIVE LENSES IF THE PATIENT USUALLY WEARS THEM):
Normal vision: sees adequately in most situations; can see medication labels, newsprint Wears soft contacts which pt refuses to remove/Normal vision: sees adequately in most situations; can see medication labels, newsprint

## 2023-11-16 NOTE — ASU DISCHARGE PLAN (ADULT/PEDIATRIC) - CARE PROVIDER_API CALL
Jack Humphries (DO)  Surgery  [address], [address]  [place] [address]  Phone: [phone]  Fax: [phone]  Follow Up Time:

## 2023-11-16 NOTE — ASU PATIENT PROFILE, ADULT - NSICDXPASTMEDICALHX_GEN_ALL_CORE_FT
PAST MEDICAL HISTORY:  2019 novel coronavirus disease (COVID-19) 3/2022 weak and tired , no hospitalization    Anemia of chronic disease epogen 3/2022  ( history of blood transfusion 2015)    Back pain Chronic    BPH (benign prostatic hyperplasia) urinary retention , complicated UTI , MRSA bacteremia s/p suprapubic catheter    Chronic kidney disease (CKD) baseline creatinine 3.0-4.0, undergoing evaluation for kidney transplant @ [facility] and [place]    Epidural abscess 3/2015 MRSA  admitted & treated  Patient was managed with Epidural injection for pain Prior to MRSA    History of MRSA infection bacteremia 2015  Acute Renal failure at that time  [facility]    Hypertension     Spinal stenosis     UTI (urinary tract infection) With Colonization of Bacteria - asymptomatic

## 2023-11-16 NOTE — BRIEF OPERATIVE NOTE - NSICDXBRIEFPROCEDURE_GEN_ALL_CORE_FT
PROCEDURES:  Robot-assisted repair of inguinal hernia 16-Nov-2023 15:08:18 LEFT side with mesh Margret Parrish

## 2023-11-16 NOTE — ASU DISCHARGE PLAN (ADULT/PEDIATRIC) - ASU DC SPECIAL INSTRUCTIONSFT
Please call the office to schedule your appointment for follow up with Surgeon in 1-2 weeks.   Call MD sooner with any questions/concerns.     OK to shower after 24hrs, allow soapy water to run over abdomen and pat dry. You have a surgical glue over your incision that will slowly come off over 2 weeks ,do not scrub.     Do not drive if taking prescribed narcotic pain medications.   Take Tylenol 650-975mg every 6 hours alternating with Motrin 600mg every hours for pain as needed. May use additional Tramadol for severe pain if needed.     Please notify MD sooner or return to the ER with any new or worsening symptoms, uncontrollable pain, foul smelling discharge or drainage from wound, excessive bleeding or swelling, fever >101, chest pain, shortness of breath, abdominal pain, nausea/vomiting, or other concerns/problems.

## 2023-11-16 NOTE — ASU PATIENT PROFILE, ADULT - SURGICAL SITE INCISION
SEAN SAWYER 84y Female  MRN#: [id number]   CODE STATUS: FULL      SUBJECTIVE  Patient is a 84y old Female who presents with a chief complaint of hip fx (22 Sep 2021 12:56)      Today is hospital day 21d,   INTERVAL HPI/OVERNIGHT EVENTS:  Examined the pt at bedside this AM. There were no acute vents overnight. Pt denies, fevers, chills, CP, SOB, NVDC, dysuria.    Present Today:           Das Catheter ()No/ ()Yes? Indwelling Urethral Catheter  Indwelling Urethral Catheter  Indwelling Urethral Catheter    Indication:             Central Line (x)No/ ()Yes?   Indication:          IV Fluids (x)No/ ()Yes? Type:  Rate:  Indication:    OBJECTIVE  PAST MEDICAL & SURGICAL HISTORY  CAD (coronary artery disease)    Chronic CHF    Atrial fibrillation    Type 2 diabetes mellitus    Colon cancer    Hypertension    CKD (chronic kidney disease)  Stage 3    History of Clostridium difficile colitis  s/p fecal transplant    Diverticulitis    S/P hysterectomy      ALLERGIES:  No Known Allergies    HOME MEDICATIONS:  Home Medications:  atorvastatin 80 mg oral tablet:  (05 Sep 2021 00:23)  calcitriol 0.25 mcg oral capsule: 1 cap(s) orally once a day (05 Sep 2021 00:23)  carvedilol 25 mg oral tablet:  (05 Sep 2021 00:23)  clopidogrel 75 mg oral tablet: 1 tab(s) orally once a day (05 Sep 2021 00:23)  Eliquis 2.5 mg oral tablet: 1 tab(s) orally 2 times a day (05 Sep 2021 00:23)  Evista 60 mg oral tablet: 1 tab(s) orally once a day (05 Sep 2021 00:23)  ferrous sulfate 325 mg (65 mg elemental iron) oral tablet: 1 tab(s) orally once a day (24 Sep 2021 15:44)  folic acid 1 mg oral tablet:  (05 Sep 2021 00:23)  furosemide 40 mg oral tablet: 1 tab(s) orally once a day (05 Sep 2021 00:23)  Lantus: 10 unit(s) subcutaneous once a day (in the morning) (05 Sep 2021 00:23)  latanoprost 0.005% ophthalmic solution: 1 drop(s) to each affected eye once a day (in the evening) (05 Sep 2021 00:23)  levothyroxine 25 mcg (0.025 mg) oral tablet: 1 tab(s) orally once a day (05 Sep 2021 00:23)  losartan 25 mg oral tablet: 1 tab(s) orally once a day (05 Sep 2021 00:23)  tamsulosin 0.4 mg oral capsule: 1 cap(s) orally once a day (at bedtime) (24 Sep 2021 15:44)    MEDICATIONS:  STANDING MEDICATIONS  acetaminophen   Tablet .. 650 milliGRAM(s) Oral every 6 hours  apixaban 2.5 milliGRAM(s) Oral two times a day  ascorbic acid 500 milliGRAM(s) Oral two times a day  atorvastatin 40 milliGRAM(s) Oral at bedtime  calcitriol   Capsule 0.25 MICROGram(s) Oral daily  carvedilol 25 milliGRAM(s) Oral every 12 hours  chlorhexidine 4% Liquid 1 Application(s) Topical <User Schedule>  clopidogrel Tablet 75 milliGRAM(s) Oral daily  dextrose 40% Gel 15 Gram(s) Oral once  dextrose 5%. 1000 milliLiter(s) (50 mL/Hr) IV Continuous <Continuous>  dextrose 5%. 1000 milliLiter(s) (100 mL/Hr) IV Continuous <Continuous>  dextrose 50% Injectable 25 Gram(s) IV Push once  ferrous    sulfate 325 milliGRAM(s) Oral daily  fidaxomicin 200 milliGRAM(s) Oral two times a day  folic acid 1 milliGRAM(s) Oral daily  furosemide    Tablet 40 milliGRAM(s) Oral daily  glucagon  Injectable 1 milliGRAM(s) IntraMuscular once  insulin glargine Injectable (LANTUS) 10 Unit(s) SubCutaneous at bedtime  insulin lispro (ADMELOG) corrective regimen sliding scale   SubCutaneous every 6 hours  insulin lispro Injectable (ADMELOG) 3 Unit(s) SubCutaneous three times a day before meals  latanoprost 0.005% Ophthalmic Solution 1 Drop(s) Both EYES at bedtime  levothyroxine 25 MICROGram(s) Oral daily  multivitamin 1 Tablet(s) Oral daily  pantoprazole  Injectable 40 milliGRAM(s) IV Push every 12 hours  raloxifene 60 milliGRAM(s) Oral daily  sodium chloride 0.9%. 1000 milliLiter(s) (75 mL/Hr) IV Continuous <Continuous>  tamsulosin 0.4 milliGRAM(s) Oral at bedtime    PRN MEDICATIONS  diphenhydrAMINE 25 milliGRAM(s) Oral at bedtime PRN  melatonin 5 milliGRAM(s) Oral at bedtime PRN  ondansetron Injectable 4 milliGRAM(s) IV Push every 6 hours PRN      VITAL SIGNS: Last 24 Hours  T(C): 35.9 (25 Sep 2021 23:12), Max: 36.5 (25 Sep 2021 15:59)  T(F): 96.7 (25 Sep 2021 23:12), Max: 97.7 (25 Sep 2021 15:59)  HR: 83 (25 Sep 2021 23:12) (79 - 90)  BP: 135/65 (25 Sep 2021 23:12) (130/60 - 138/91)  BP(mean): --  RR: 17 (25 Sep 2021 23:12) (17 - 19)  SpO2: --    LABS:                        8.6    11.32 )-----------( 278      ( 24 Sep 2021 08:46 )             27.0     09-24    138  |  107  |  45<H>  ----------------------------<  89  4.3   |  19  |  1.4    Ca    9.1      24 Sep 2021 08:46  Mg     1.8     09-24    TPro  4.4<L>  /  Alb  2.4<L>  /  TBili  0.5  /  DBili  x   /  AST  27  /  ALT  24  /  AlkPhos  153<H>  09-24        PHYSICAL EXAM:    GENERAL: NAD, well-developed, AAOx3  HEENT:  Atraumatic, Normocephalic. EOMI, PERRLA, conjunctiva and sclera clear, No JVD  PULMONARY: Clear to auscultation bilaterally; No wheeze  CARDIOVASCULAR: Regular rate and rhythm; No murmurs, rubs, or gallops  GASTROINTESTINAL: Soft, Nontender, Nondistended; Bowel sounds present  MUSCULOSKELETAL:  2+ Peripheral Pulses, No clubbing, cyanosis, or edema  NEUROLOGY: non-focal  SKIN: No rashes or lesions     no

## 2023-11-16 NOTE — ASU PATIENT PROFILE, ADULT - NSICDXPASTSURGICALHX_GEN_ALL_CORE_FT
PAST SURGICAL HISTORY:  Epidural abscess Surgical intervention  3/2015    History of bladder surgery 12/30/2015 suprapubic catheter changed q 4-5 weeks by urology , last exchange 5/20/2022    History of colonoscopy 2021    History of lumbar laminectomy 3/2015 due to MRSA infection disc removed     PAST SURGICAL HISTORY:  Epidural abscess Surgical intervention  3/2015    History of bladder surgery 12/30/2015 suprapubic catheter changed q 4-5 weeks by urology , last change 11/9/2023    History of colonoscopy 2021    History of lumbar laminectomy 3/2015 due to MRSA infection disc removed

## 2023-11-24 ENCOUNTER — NON-APPOINTMENT (OUTPATIENT)
Age: 73
End: 2023-11-24

## 2023-11-30 RX ORDER — NALOXONE HYDROCHLORIDE 4 MG/.1ML
4 SPRAY NASAL
Qty: 1 | Refills: 1 | Status: ACTIVE | COMMUNITY
Start: 2023-11-30 | End: 1900-01-01

## 2023-12-04 ENCOUNTER — APPOINTMENT (OUTPATIENT)
Dept: PAIN MANAGEMENT | Facility: CLINIC | Age: 73
End: 2023-12-04
Payer: MEDICARE

## 2023-12-04 PROCEDURE — 99212 OFFICE O/P EST SF 10 MIN: CPT | Mod: 95

## 2024-02-22 ENCOUNTER — NON-APPOINTMENT (OUTPATIENT)
Age: 74
End: 2024-02-22

## 2024-03-29 ENCOUNTER — LABORATORY RESULT (OUTPATIENT)
Age: 74
End: 2024-03-29

## 2024-03-29 ENCOUNTER — APPOINTMENT (OUTPATIENT)
Dept: PAIN MANAGEMENT | Facility: CLINIC | Age: 74
End: 2024-03-29
Payer: MEDICARE

## 2024-03-29 VITALS
WEIGHT: 131 LBS | DIASTOLIC BLOOD PRESSURE: 69 MMHG | HEART RATE: 56 BPM | HEIGHT: 68 IN | BODY MASS INDEX: 19.85 KG/M2 | SYSTOLIC BLOOD PRESSURE: 119 MMHG

## 2024-03-29 PROCEDURE — 99214 OFFICE O/P EST MOD 30 MIN: CPT

## 2024-03-29 NOTE — HISTORY OF PRESENT ILLNESS
[FreeTextEntry1] : 72 y/o M HTN, HLD, CKD stage 5 with Lumbar epidural abscess with hypotonic bladder and left foot drop with chronic lower back pain who is being seen for follow up.   Reports no new medical issues.  No new medical issues.  He tripped and fell yesterday while walking Left knee and shoulder abrasion, no LOC or head injury.  He continues to c/o chronic constant lower back pain occasionally radiating down LLE to buttock down to 2-3/10 with meds.  Pain well controlled with current meds.   Jan 2023 creat 3.05 , Nov 2023 was 3.03 . Avoiding proteins eggs, red meat, dairy.     Denies AE, + constipation on occasion but normal BM   Prior meds: Cymbalta, Soma, Gabapentin, Lyrica  Current meds: Oxycodone 7.5/325 mg 3-4x/day.   Social: Recently visited [place] and [facility].

## 2024-03-29 NOTE — REVIEW OF SYSTEMS
[Back Pain] : ~T back pain [As Noted in HPI] : as noted in HPI [Limb Weakness] : limb weakness [Negative] : Heme/Lymph [de-identified] : left foot drop

## 2024-03-29 NOTE — ASSESSMENT
[Opioids] : Patient was explained in detail about pain control by using opioids. Patient has signed and fully understands our guidelines for medication and drug screening.  Patient understands the side effects of opioids, including, but not limited to, drug tolerance, dependence, potential for addiction. This class of drugs is habit-forming and PAULINA regulated. The sedative effects of opioids can be potentiated by taking alcohol or any sleeping pills, along with opioids. The decision to drive is patient's responsibility, as opioids can affect his/her driving ability and ability to concentrate. The long-term place is not clear, however, patient understands that once the pain control optimizes, the goal will be to wean off the opioids. All the issues regarding opioid treatment have been addressed satisfactorily.  [FreeTextEntry1] : 72 y/o M CKD stage 5 with suprapubic cath with Lumbar epidural abscess with hypotonic bladder and left foot drop with chronic lower back pain. He is candidate for renal transplant at [facility] and waiting to get on the transplant list which could take up to 4-8 years.   continue Oxycodone 7.5/325 mg 3-4x/day qty 100 , UDS performed March 29th 2024 I-Stop reviewed, reference #: [id number] No signs of aberrant behavior and will continue to monitor for signs of toxicity. Reminded to continue to avoid alcohol. Patient denies other prescribers. Discussed OD prevention education and prescribed naloxone kit Safe storage of medication was reviewed. Opiate agreement was renewed Sep 06, 2023.

## 2024-04-03 LAB
AMPHET UR-MCNC: NEGATIVE NG/ML
BARBITURATES UR-MCNC: NEGATIVE NG/ML
BENZODIAZ UR-MCNC: NEGATIVE NG/ML
COCAINE METAB.OTHER UR-MCNC: NEGATIVE NG/ML
CREATININE, URINE: 71.4 MG/DL
FENTANYL, URINE: NEGATIVE NG/ML
METHADONE UR-MCNC: NEGATIVE NG/ML
OPIATES UR-MCNC: NEGATIVE NG/ML
OXYCODONE/OXYMORPHONE, URINE: NORMAL NG/ML
PCP UR-MCNC: NEGATIVE NG/ML
PH, URINE: 5.5
PLEASE NOTE: DRUGSCRUR: NORMAL
THC UR QL: NEGATIVE NG/ML

## 2024-06-21 RX ORDER — OXYCODONE AND ACETAMINOPHEN 7.5; 325 MG/1; MG/1
7.5-325 TABLET ORAL
Qty: 120 | Refills: 0 | Status: ACTIVE | COMMUNITY
Start: 2021-05-26 | End: 1900-01-01

## 2024-07-25 ENCOUNTER — NON-APPOINTMENT (OUTPATIENT)
Age: 74
End: 2024-07-25

## 2024-09-24 ENCOUNTER — NON-APPOINTMENT (OUTPATIENT)
Age: 74
End: 2024-09-24

## 2024-09-25 ENCOUNTER — APPOINTMENT (OUTPATIENT)
Dept: PAIN MANAGEMENT | Facility: CLINIC | Age: 74
End: 2024-09-25
Payer: MEDICARE

## 2024-09-25 VITALS
HEART RATE: 50 BPM | BODY MASS INDEX: 19.7 KG/M2 | SYSTOLIC BLOOD PRESSURE: 163 MMHG | HEIGHT: 68 IN | WEIGHT: 130 LBS | DIASTOLIC BLOOD PRESSURE: 84 MMHG

## 2024-09-25 PROCEDURE — 99214 OFFICE O/P EST MOD 30 MIN: CPT

## 2024-09-25 NOTE — REVIEW OF SYSTEMS
[Back Pain] : ~T back pain [As Noted in HPI] : as noted in HPI [Limb Weakness] : limb weakness [Negative] : Heme/Lymph [de-identified] : left foot drop

## 2024-09-25 NOTE — HISTORY OF PRESENT ILLNESS
[FreeTextEntry1] : 74 y/o M HTN, HLD, CKD stage 5 with Lumbar epidural abscess with hypotonic bladder and left foot drop with chronic lower back pain who is being seen for follow up.   Since his last visit reports some increase in lower back pain B/L midline onset of lifting something heavy 2 weeks ago, Took Ibuprofen 400 mg once which helped and pain gradually improving.  Typically, c/o chronic constant lower back pain  2-3/10 with meds, only occasionally radiating down LLE to buttock down to 2-3/10 with meds.  Pain well controlled with current meds.  Avoiding proteins eggs, red meat, dairy.    Denies AE, episodic constipation but normal BM   Prior meds: Cymbalta, Soma, Gabapentin, Lyrica  Current meds: Oxycodone 7.5/325 mg 3-4x/day.   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  He has 2 children daughter in [place] as works as a film director. His son is a psychiatrist in [place].  Went to [place]. Recently traveled to [employer] football game and will be travelling to [place] to visit daughter.

## 2024-09-25 NOTE — ASSESSMENT
[Opioids] : Patient was explained in detail about pain control by using opioids. Patient has signed and fully understands our guidelines for medication and drug screening.  Patient understands the side effects of opioids, including, but not limited to, drug tolerance, dependence, potential for addiction. This class of drugs is habit-forming and PAUL[place] regulated. The sedative effects of opioids can be potentiated by taking alcohol or any sleeping pills, along with opioids. The decision to drive is patient's responsibility, as opioids can affect his/her driving ability and ability to concentrate. The long-term place is not clear, however, patient understands that once the pain control optimizes, the goal will be to wean off the opioids. All the issues regarding opioid treatment have been addressed satisfactorily.  [FreeTextEntry1] : 74 y/o M CKD stage 5 with suprapubic cath with Lumbar epidural abscess with hypotonic bladder and left foot drop with chronic lower back pain. He is candidate for renal transplant at [facility] and waiting to get on the transplant list which could take up to 4-8 years.   continue Oxycodone 7.5/325 mg 3-4x/day qty 100 , UDS performed March 29th 2024 c/w rx.  I-Stop reviewed, reference #: [id number] No signs of aberrant behavior and will continue to monitor for signs of toxicity. Reminded to continue to avoid alcohol. Patient denies other prescribers. Discussed OD prevention education and prescribed naloxone kit Safe storage of medication was reviewed. Opiate agreement was renewed Sep 06, 2023.

## 2025-01-02 ENCOUNTER — NON-APPOINTMENT (OUTPATIENT)
Age: 75
End: 2025-01-02

## 2025-02-02 ENCOUNTER — NON-APPOINTMENT (OUTPATIENT)
Age: 75
End: 2025-02-02

## 2025-02-11 ENCOUNTER — APPOINTMENT (OUTPATIENT)
Dept: PAIN MANAGEMENT | Facility: CLINIC | Age: 75
End: 2025-02-11
Payer: MEDICARE

## 2025-02-11 ENCOUNTER — NON-APPOINTMENT (OUTPATIENT)
Age: 75
End: 2025-02-11

## 2025-02-11 PROCEDURE — 99212 OFFICE O/P EST SF 10 MIN: CPT | Mod: 2W

## 2025-02-28 NOTE — ED PROVIDER NOTE - CLINICAL SUMMARY MEDICAL DECISION MAKING FREE TEXT BOX
No offered labs / cxr and full cardiac care w/u and stressed importance of such though adamantly declines and asks for AMA d/c

## 2025-05-14 ENCOUNTER — NON-APPOINTMENT (OUTPATIENT)
Age: 75
End: 2025-05-14

## 2025-05-16 ENCOUNTER — LABORATORY RESULT (OUTPATIENT)
Age: 75
End: 2025-05-16

## 2025-05-16 ENCOUNTER — NON-APPOINTMENT (OUTPATIENT)
Age: 75
End: 2025-05-16

## 2025-05-16 ENCOUNTER — APPOINTMENT (OUTPATIENT)
Dept: PAIN MANAGEMENT | Facility: CLINIC | Age: 75
End: 2025-05-16
Payer: MEDICARE

## 2025-05-16 VITALS
DIASTOLIC BLOOD PRESSURE: 65 MMHG | WEIGHT: 130 LBS | HEART RATE: 63 BPM | HEIGHT: 68 IN | SYSTOLIC BLOOD PRESSURE: 121 MMHG | BODY MASS INDEX: 19.7 KG/M2

## 2025-05-16 DIAGNOSIS — G89.4 CHRONIC PAIN SYNDROME: ICD-10-CM

## 2025-05-16 PROCEDURE — 99214 OFFICE O/P EST MOD 30 MIN: CPT

## 2025-05-23 LAB
AMPHET UR-MCNC: NEGATIVE NG/ML
BARBITURATES UR-MCNC: NEGATIVE NG/ML
BENZODIAZ UR-MCNC: NEGATIVE NG/ML
COCAINE METAB.OTHER UR-MCNC: NEGATIVE NG/ML
CREATININE, URINE: 43 MG/DL
FENTANYL, URINE: NEGATIVE NG/ML
METHADONE SCREEN, UR: NEGATIVE NG/ML
OPIATES UR-MCNC: NEGATIVE NG/ML
OXYCODONE/OXYMORPHONE, URINE: NORMAL NG/ML
PCP UR-MCNC: NEGATIVE NG/ML
PH, URINE: 5.7
THC UR QL: NEGATIVE NG/ML

## 2025-09-04 ENCOUNTER — NON-APPOINTMENT (OUTPATIENT)
Age: 75
End: 2025-09-04

## (undated) DEVICE — DRAPE BACK TABLE COVER HEAVY DUTY 60"

## (undated) DEVICE — WARMING BLANKET UPPER ADULT

## (undated) DEVICE — FOLEY TRAY 16FR 5CC LF UMETER CLOSED

## (undated) DEVICE — SUT POLYSORB 0 30" GU-45

## (undated) DEVICE — SPECIMEN CONTAINER 100ML

## (undated) DEVICE — DRAIN PENROSE .5" X 18" LATEX

## (undated) DEVICE — SUT MONOCRYL 4-0 27" PS-2 UNDYED

## (undated) DEVICE — TUBING INSUFFLATION LAP FILTER 10FT

## (undated) DEVICE — WARMING BLANKET LOWER ADULT

## (undated) DEVICE — SUT POLYSORB 1 60" TIES

## (undated) DEVICE — ELCTR BOVIE PENCIL SMOKE EVACUATION

## (undated) DEVICE — SUT VLOC 180 3-0 9" V-20 GREEN

## (undated) DEVICE — APPLICATOR SURGICEL LAP TROCAR POINT 2.5MM X 150MM

## (undated) DEVICE — DRSG STERISTRIPS 0.5 X 4"

## (undated) DEVICE — VISITEC 4X4

## (undated) DEVICE — MARKING PEN W RULER

## (undated) DEVICE — SUT POLYSORB 0 36" GU-46

## (undated) DEVICE — TROCAR COVIDIEN VERSAPORT BLADELESS OPTICAL 5MM STANDARD

## (undated) DEVICE — TROCAR COVIDIEN ENDO CLOSE SUTURING DEVICE

## (undated) DEVICE — SOL IRR POUR H2O 250ML

## (undated) DEVICE — CATH ANGIO 14G X 3.25"

## (undated) DEVICE — ELCTR BOVIE TIP BLADE INSULATED 2.75" EDGE

## (undated) DEVICE — SUT POLYSORB 3-0 30" P-12 UNDYED

## (undated) DEVICE — GLV 7.5 PROTEXIS (WHITE)

## (undated) DEVICE — TUBING SUCTION 20FT

## (undated) DEVICE — PREP CHLORAPREP HI-LITE ORANGE 26ML

## (undated) DEVICE — TROCAR SURGIQUEST AIRSEAL 5MM X 100MM

## (undated) DEVICE — NDL HYPO REGULAR BEVEL 25G X 1.5" (BLUE)

## (undated) DEVICE — XI VESSEL SEALER

## (undated) DEVICE — VENODYNE/SCD SLEEVE CALF MEDIUM

## (undated) DEVICE — XI ARM NEEDLE DRIVER SUTURECUT MEGA 8MM

## (undated) DEVICE — D HELP - CLEARVIEW CLEARIFY SYSTEM

## (undated) DEVICE — INSUFFLATION NDL COVIDIEN STEP 14G SHORT FOR STEP/VERSASTEP

## (undated) DEVICE — XI SEAL UNIV 5- 8 MM

## (undated) DEVICE — XI ARM SCISSOR MONO CURVED

## (undated) DEVICE — DRSG TEGADERM 6"X8"

## (undated) DEVICE — DRAPE MAYO STAND 30"

## (undated) DEVICE — DRAPE INSTRUMENT POUCH 6.75" X 11"

## (undated) DEVICE — XI DRAPE ARM

## (undated) DEVICE — TUBING STRYKEFLOW II SUCTION / IRRIGATOR

## (undated) DEVICE — DRSG OPSITE 13.75 X 4"

## (undated) DEVICE — XI DRAPE COLUMN

## (undated) DEVICE — POSITIONER PURPLE ARM ONE STEP (LARGE)

## (undated) DEVICE — XI ARM FORCEP FENESTRATED BIPOLAR 8MM

## (undated) DEVICE — TUBING IRRIGATION DAVOL SYSTEM X STREAM

## (undated) DEVICE — XI OBTURATOR OPTICAL BLADELESS 8MM

## (undated) DEVICE — MEDICATION LABELS W MARKER

## (undated) DEVICE — FOLEY HOLDER STATLOCK 2 WAY ADULT

## (undated) DEVICE — FOLEY TRAY 16FR 5CC LTX UMETER CLOSED

## (undated) DEVICE — SOL IRR POUR NS 0.9% 500ML

## (undated) DEVICE — XI TIP COVER

## (undated) DEVICE — GLV 8 PROTEXIS (BLUE)

## (undated) DEVICE — SUT VLOC 180 2-0 12" V-20 GREEN

## (undated) DEVICE — DRAIN PENROSE .25" X 18" LATEX

## (undated) DEVICE — TUBING AIRSEAL TRI-LUMEN FILTERED

## (undated) DEVICE — POSITIONER FOAM EGG CRATE ULNAR 2PCS (PINK)

## (undated) DEVICE — POSITIONER FOAM HEAD CRADLE (PINK)

## (undated) DEVICE — DRSG MASTISOL

## (undated) DEVICE — VENODYNE/SCD SLEEVE FOOT

## (undated) DEVICE — BLADE SCALPEL SAFETYLOCK #15

## (undated) DEVICE — PACK ROBOTIC LIJ

## (undated) DEVICE — TROCAR APPLIED MEDICAL KII BALLOON BLUNT TIP 12MM X 100MM

## (undated) DEVICE — PACK GENERAL LAPAROSCOPY

## (undated) DEVICE — TUBING OLYMPUS INSUFFLATION

## (undated) DEVICE — XI SEAL UNIVERSIAL 5-12MM

## (undated) DEVICE — LAP PAD 18 X 18"